# Patient Record
Sex: FEMALE | Race: WHITE | Employment: OTHER | URBAN - METROPOLITAN AREA
[De-identification: names, ages, dates, MRNs, and addresses within clinical notes are randomized per-mention and may not be internally consistent; named-entity substitution may affect disease eponyms.]

---

## 2017-10-30 ENCOUNTER — HOSPITAL ENCOUNTER (EMERGENCY)
Facility: HOSPITAL | Age: 67
Discharge: HOME | End: 2017-10-30
Attending: EMERGENCY MEDICINE
Payer: MEDICARE

## 2017-10-30 ENCOUNTER — APPOINTMENT (OUTPATIENT)
Dept: GENERAL RADIOLOGY | Facility: HOSPITAL | Age: 67
End: 2017-10-30
Attending: EMERGENCY MEDICINE
Payer: MEDICARE

## 2017-10-30 VITALS
SYSTOLIC BLOOD PRESSURE: 134 MMHG | HEART RATE: 67 BPM | TEMPERATURE: 98 F | OXYGEN SATURATION: 100 % | DIASTOLIC BLOOD PRESSURE: 75 MMHG | HEIGHT: 68 IN | RESPIRATION RATE: 16 BRPM | BODY MASS INDEX: 26.52 KG/M2 | WEIGHT: 175 LBS

## 2017-10-30 DIAGNOSIS — I47.1 SVT (SUPRAVENTRICULAR TACHYCARDIA) (HCC): Primary | ICD-10-CM

## 2017-10-30 PROCEDURE — 71010 XR CHEST AP PORTABLE  (CPT=71010): CPT | Performed by: EMERGENCY MEDICINE

## 2017-10-30 PROCEDURE — 93010 ELECTROCARDIOGRAM REPORT: CPT

## 2017-10-30 PROCEDURE — 96374 THER/PROPH/DIAG INJ IV PUSH: CPT

## 2017-10-30 PROCEDURE — 80053 COMPREHEN METABOLIC PANEL: CPT | Performed by: EMERGENCY MEDICINE

## 2017-10-30 PROCEDURE — 93005 ELECTROCARDIOGRAM TRACING: CPT

## 2017-10-30 PROCEDURE — 99285 EMERGENCY DEPT VISIT HI MDM: CPT

## 2017-10-30 PROCEDURE — 84443 ASSAY THYROID STIM HORMONE: CPT | Performed by: EMERGENCY MEDICINE

## 2017-10-30 RX ORDER — ADENOSINE 3 MG/ML
6 INJECTION, SOLUTION INTRAVENOUS ONCE
Status: COMPLETED | OUTPATIENT
Start: 2017-10-30 | End: 2017-10-30

## 2017-10-30 RX ORDER — ADENOSINE 3 MG/ML
INJECTION, SOLUTION INTRAVENOUS
Status: DISCONTINUED
Start: 2017-10-30 | End: 2017-10-30

## 2017-10-30 RX ORDER — METOPROLOL SUCCINATE 25 MG/1
25 TABLET, EXTENDED RELEASE ORAL DAILY
Qty: 30 TABLET | Refills: 0 | Status: SHIPPED | OUTPATIENT
Start: 2017-10-30 | End: 2017-11-03

## 2017-10-30 NOTE — ED PROVIDER NOTES
Patient Seen in: [facility] Emergency Department    History   Patient presents with:  Arrythmia/Palpitations (cardiovascular)    Stated Complaint: Palpitations    HPI    42-year-old female presents to the ER with rapid heart rate.   She was brought t 8\")   Wt 79.4 kg   SpO2 100%   BMI 26.61 kg/m²         Physical Exam  General: This a pleasant nontoxic appearing patient in no apparent distress alert and oriented ×3  HEENT: Pupils are equal reactive to light. Extra ocular motions are intact.   No scler 10/30/2017 at 15:39     Approved by: America Nesbitt MD              ============================================================  ED Course  ------------------------------------------------------------  [facility]   The patient had a line established and was p

## 2017-10-30 NOTE — ED INITIAL ASSESSMENT (HPI)
Pt had recently been wearing a halter monitor for complaints of palpitations. Went for follow up visit at Dr. Scar Bermeo office and pt was in SVT, hr 180's.

## 2017-11-16 PROCEDURE — 86803 HEPATITIS C AB TEST: CPT | Performed by: INTERNAL MEDICINE

## 2017-11-16 PROCEDURE — 81003 URINALYSIS AUTO W/O SCOPE: CPT | Performed by: INTERNAL MEDICINE

## 2018-01-03 ENCOUNTER — CHARTING TRANS (OUTPATIENT)
Dept: OTHER | Age: 68
End: 2018-01-03

## 2018-05-23 PROBLEM — I47.10 SVT (SUPRAVENTRICULAR TACHYCARDIA) (HCC): Status: ACTIVE | Noted: 2018-05-23

## 2018-05-23 PROBLEM — I47.10 SVT (SUPRAVENTRICULAR TACHYCARDIA): Status: ACTIVE | Noted: 2018-05-23

## 2018-05-23 PROBLEM — I47.1 SVT (SUPRAVENTRICULAR TACHYCARDIA) (HCC): Status: ACTIVE | Noted: 2018-05-23

## 2018-07-03 PROBLEM — E55.9 VITAMIN D DEFICIENCY: Status: ACTIVE | Noted: 2018-07-03

## 2018-07-11 PROBLEM — Z12.11 SCREENING FOR MALIGNANT NEOPLASM OF COLON: Status: ACTIVE | Noted: 2018-07-11

## 2018-07-24 PROBLEM — M17.0 PRIMARY OSTEOARTHRITIS OF BOTH KNEES: Status: ACTIVE | Noted: 2018-07-24

## 2018-10-22 PROCEDURE — 88305 TISSUE EXAM BY PATHOLOGIST: CPT | Performed by: SPECIALIST

## 2018-11-02 VITALS
OXYGEN SATURATION: 97 % | SYSTOLIC BLOOD PRESSURE: 132 MMHG | TEMPERATURE: 98.3 F | RESPIRATION RATE: 16 BRPM | HEART RATE: 160 BPM | DIASTOLIC BLOOD PRESSURE: 92 MMHG | WEIGHT: 170 LBS

## 2019-01-22 PROBLEM — M19.012 GLENOHUMERAL ARTHRITIS, LEFT: Status: ACTIVE | Noted: 2019-01-22

## 2019-01-22 PROBLEM — M75.22 LEFT BICIPITAL TENOSYNOVITIS: Status: ACTIVE | Noted: 2019-01-22

## 2019-01-22 PROBLEM — M75.42 IMPINGEMENT SYNDROME OF SHOULDER, LEFT: Status: ACTIVE | Noted: 2019-01-22

## 2019-03-22 PROBLEM — N18.30 CKD (CHRONIC KIDNEY DISEASE) STAGE 3, GFR 30-59 ML/MIN (HCC): Status: ACTIVE | Noted: 2019-03-22

## 2019-06-19 PROBLEM — M19.012 GLENOHUMERAL ARTHRITIS, LEFT: Status: RESOLVED | Noted: 2019-01-22 | Resolved: 2019-06-19

## 2019-06-19 PROBLEM — M75.22 LEFT BICIPITAL TENOSYNOVITIS: Status: RESOLVED | Noted: 2019-01-22 | Resolved: 2019-06-19

## 2019-06-19 PROBLEM — M17.0 PRIMARY OSTEOARTHRITIS OF BOTH KNEES: Status: RESOLVED | Noted: 2018-07-24 | Resolved: 2019-06-19

## 2019-06-19 PROBLEM — Z12.11 SCREENING FOR MALIGNANT NEOPLASM OF COLON: Status: RESOLVED | Noted: 2018-07-11 | Resolved: 2019-06-19

## 2019-06-19 PROBLEM — M75.42 IMPINGEMENT SYNDROME OF SHOULDER, LEFT: Status: RESOLVED | Noted: 2019-01-22 | Resolved: 2019-06-19

## 2019-06-26 PROBLEM — M17.12 PRIMARY OSTEOARTHRITIS OF LEFT KNEE: Status: ACTIVE | Noted: 2019-06-26

## 2019-10-10 PROCEDURE — 87077 CULTURE AEROBIC IDENTIFY: CPT | Performed by: INTERNAL MEDICINE

## 2019-10-10 PROCEDURE — 87186 SC STD MICRODIL/AGAR DIL: CPT | Performed by: INTERNAL MEDICINE

## 2019-10-10 PROCEDURE — 87086 URINE CULTURE/COLONY COUNT: CPT | Performed by: INTERNAL MEDICINE

## 2021-08-13 PROBLEM — N18.31 STAGE 3A CHRONIC KIDNEY DISEASE (HCC): Status: ACTIVE | Noted: 2019-03-22

## 2021-08-13 PROBLEM — N18.31 HYPERTENSIVE KIDNEY DISEASE WITH STAGE 3A CHRONIC KIDNEY DISEASE (HCC): Status: ACTIVE | Noted: 2021-08-13

## 2021-08-13 PROBLEM — I12.9 HYPERTENSIVE KIDNEY DISEASE WITH STAGE 3A CHRONIC KIDNEY DISEASE (HCC): Status: ACTIVE | Noted: 2021-08-13

## 2023-03-27 ENCOUNTER — TELEPHONE (OUTPATIENT)
Dept: PHYSICAL THERAPY | Facility: HOSPITAL | Age: 73
End: 2023-03-27

## 2023-03-29 ENCOUNTER — APPOINTMENT (OUTPATIENT)
Dept: SPEECH THERAPY | Facility: HOSPITAL | Age: 73
End: 2023-03-29
Attending: STUDENT IN AN ORGANIZED HEALTH CARE EDUCATION/TRAINING PROGRAM
Payer: COMMERCIAL

## 2023-04-03 ENCOUNTER — APPOINTMENT (OUTPATIENT)
Dept: SPEECH THERAPY | Facility: HOSPITAL | Age: 73
End: 2023-04-03
Attending: STUDENT IN AN ORGANIZED HEALTH CARE EDUCATION/TRAINING PROGRAM
Payer: COMMERCIAL

## 2023-04-05 ENCOUNTER — APPOINTMENT (OUTPATIENT)
Dept: SPEECH THERAPY | Facility: HOSPITAL | Age: 73
End: 2023-04-05
Attending: STUDENT IN AN ORGANIZED HEALTH CARE EDUCATION/TRAINING PROGRAM
Payer: COMMERCIAL

## 2023-04-10 ENCOUNTER — APPOINTMENT (OUTPATIENT)
Dept: SPEECH THERAPY | Facility: HOSPITAL | Age: 73
End: 2023-04-10
Attending: STUDENT IN AN ORGANIZED HEALTH CARE EDUCATION/TRAINING PROGRAM
Payer: COMMERCIAL

## 2023-04-12 ENCOUNTER — APPOINTMENT (OUTPATIENT)
Dept: SPEECH THERAPY | Facility: HOSPITAL | Age: 73
End: 2023-04-12
Attending: STUDENT IN AN ORGANIZED HEALTH CARE EDUCATION/TRAINING PROGRAM
Payer: COMMERCIAL

## 2023-04-17 ENCOUNTER — APPOINTMENT (OUTPATIENT)
Dept: SPEECH THERAPY | Facility: HOSPITAL | Age: 73
End: 2023-04-17
Attending: STUDENT IN AN ORGANIZED HEALTH CARE EDUCATION/TRAINING PROGRAM
Payer: COMMERCIAL

## 2023-10-17 ENCOUNTER — LAB ENCOUNTER (OUTPATIENT)
Dept: LAB | Age: 73
End: 2023-10-17
Attending: INTERNAL MEDICINE
Payer: MEDICARE

## 2023-10-17 ENCOUNTER — LAB ENCOUNTER (OUTPATIENT)
Dept: LAB | Age: 73
End: 2023-10-17
Attending: STUDENT IN AN ORGANIZED HEALTH CARE EDUCATION/TRAINING PROGRAM
Payer: MEDICARE

## 2023-10-17 DIAGNOSIS — R63.4 WEIGHT LOSS: ICD-10-CM

## 2023-10-17 LAB
ALBUMIN SERPL-MCNC: 3.4 G/DL (ref 3.4–5)
ALBUMIN/GLOB SERPL: 1.1 {RATIO} (ref 1–2)
ALP LIVER SERPL-CCNC: 56 U/L
ALT SERPL-CCNC: 22 U/L
ANION GAP SERPL CALC-SCNC: 7 MMOL/L (ref 0–18)
AST SERPL-CCNC: 19 U/L (ref 15–37)
BASOPHILS # BLD AUTO: 0.06 X10(3) UL (ref 0–0.2)
BASOPHILS NFR BLD AUTO: 0.9 %
BILIRUB SERPL-MCNC: 0.6 MG/DL (ref 0.1–2)
BUN BLD-MCNC: 18 MG/DL (ref 7–18)
CALCIUM BLD-MCNC: 9.2 MG/DL (ref 8.5–10.1)
CHLORIDE SERPL-SCNC: 105 MMOL/L (ref 98–112)
CO2 SERPL-SCNC: 27 MMOL/L (ref 21–32)
CREAT BLD-MCNC: 0.88 MG/DL
EGFRCR SERPLBLD CKD-EPI 2021: 69 ML/MIN/1.73M2 (ref 60–?)
EOSINOPHIL # BLD AUTO: 0.04 X10(3) UL (ref 0–0.7)
EOSINOPHIL NFR BLD AUTO: 0.6 %
ERYTHROCYTE [DISTWIDTH] IN BLOOD BY AUTOMATED COUNT: 13.2 %
FASTING STATUS PATIENT QL REPORTED: YES
GLOBULIN PLAS-MCNC: 3.1 G/DL (ref 2.8–4.4)
GLUCOSE BLD-MCNC: 83 MG/DL (ref 70–99)
HCT VFR BLD AUTO: 34.5 %
HGB BLD-MCNC: 11.3 G/DL
IGA SERPL-MCNC: 266 MG/DL (ref 70–312)
IMM GRANULOCYTES # BLD AUTO: 0.02 X10(3) UL (ref 0–1)
IMM GRANULOCYTES NFR BLD: 0.3 %
LYMPHOCYTES # BLD AUTO: 1.4 X10(3) UL (ref 1–4)
LYMPHOCYTES NFR BLD AUTO: 21.3 %
MCH RBC QN AUTO: 30.1 PG (ref 26–34)
MCHC RBC AUTO-ENTMCNC: 32.8 G/DL (ref 31–37)
MCV RBC AUTO: 91.8 FL
MONOCYTES # BLD AUTO: 0.4 X10(3) UL (ref 0.1–1)
MONOCYTES NFR BLD AUTO: 6.1 %
NEUTROPHILS # BLD AUTO: 4.64 X10 (3) UL (ref 1.5–7.7)
NEUTROPHILS # BLD AUTO: 4.64 X10(3) UL (ref 1.5–7.7)
NEUTROPHILS NFR BLD AUTO: 70.8 %
OSMOLALITY SERPL CALC.SUM OF ELEC: 289 MOSM/KG (ref 275–295)
PLATELET # BLD AUTO: 273 10(3)UL (ref 150–450)
POTASSIUM SERPL-SCNC: 4.5 MMOL/L (ref 3.5–5.1)
PROT SERPL-MCNC: 6.5 G/DL (ref 6.4–8.2)
RBC # BLD AUTO: 3.76 X10(6)UL
SODIUM SERPL-SCNC: 139 MMOL/L (ref 136–145)
WBC # BLD AUTO: 6.6 X10(3) UL (ref 4–11)

## 2023-10-17 PROCEDURE — 86231 EMA EACH IG CLASS: CPT

## 2023-10-17 PROCEDURE — 36415 COLL VENOUS BLD VENIPUNCTURE: CPT

## 2023-10-17 PROCEDURE — 80053 COMPREHEN METABOLIC PANEL: CPT

## 2023-10-17 PROCEDURE — 82784 ASSAY IGA/IGD/IGG/IGM EACH: CPT

## 2023-10-17 PROCEDURE — 85025 COMPLETE CBC W/AUTO DIFF WBC: CPT

## 2023-10-17 PROCEDURE — 86364 TISS TRNSGLTMNASE EA IG CLAS: CPT

## 2023-10-18 LAB — ENDOMYSIAL IGA AB: NEGATIVE

## 2023-10-19 ENCOUNTER — ANESTHESIA EVENT (OUTPATIENT)
Dept: ENDOSCOPY | Facility: HOSPITAL | Age: 73
End: 2023-10-19
Payer: MEDICARE

## 2023-10-19 ENCOUNTER — ANESTHESIA (OUTPATIENT)
Dept: ENDOSCOPY | Facility: HOSPITAL | Age: 73
End: 2023-10-19
Payer: MEDICARE

## 2023-10-19 ENCOUNTER — HOSPITAL ENCOUNTER (OUTPATIENT)
Facility: HOSPITAL | Age: 73
Setting detail: HOSPITAL OUTPATIENT SURGERY
Discharge: HOME | End: 2023-10-19
Attending: STUDENT IN AN ORGANIZED HEALTH CARE EDUCATION/TRAINING PROGRAM | Admitting: STUDENT IN AN ORGANIZED HEALTH CARE EDUCATION/TRAINING PROGRAM
Payer: MEDICARE

## 2023-10-19 VITALS
OXYGEN SATURATION: 98 % | DIASTOLIC BLOOD PRESSURE: 64 MMHG | SYSTOLIC BLOOD PRESSURE: 136 MMHG | WEIGHT: 111 LBS | RESPIRATION RATE: 16 BRPM | TEMPERATURE: 98 F | HEART RATE: 60 BPM | HEIGHT: 67.5 IN | BODY MASS INDEX: 17.22 KG/M2

## 2023-10-19 DIAGNOSIS — R63.4 WEIGHT LOSS: ICD-10-CM

## 2023-10-19 PROCEDURE — 88305 TISSUE EXAM BY PATHOLOGIST: CPT | Performed by: STUDENT IN AN ORGANIZED HEALTH CARE EDUCATION/TRAINING PROGRAM

## 2023-10-19 PROCEDURE — 0DB78ZX EXCISION OF STOMACH, PYLORUS, VIA NATURAL OR ARTIFICIAL OPENING ENDOSCOPIC, DIAGNOSTIC: ICD-10-PCS | Performed by: STUDENT IN AN ORGANIZED HEALTH CARE EDUCATION/TRAINING PROGRAM

## 2023-10-19 PROCEDURE — 0DB98ZX EXCISION OF DUODENUM, VIA NATURAL OR ARTIFICIAL OPENING ENDOSCOPIC, DIAGNOSTIC: ICD-10-PCS | Performed by: STUDENT IN AN ORGANIZED HEALTH CARE EDUCATION/TRAINING PROGRAM

## 2023-10-19 RX ORDER — ONDANSETRON 2 MG/ML
4 INJECTION INTRAMUSCULAR; INTRAVENOUS EVERY 6 HOURS PRN
Status: DISCONTINUED | OUTPATIENT
Start: 2023-10-19 | End: 2023-10-19

## 2023-10-19 RX ORDER — SODIUM CHLORIDE, SODIUM LACTATE, POTASSIUM CHLORIDE, CALCIUM CHLORIDE 600; 310; 30; 20 MG/100ML; MG/100ML; MG/100ML; MG/100ML
INJECTION, SOLUTION INTRAVENOUS CONTINUOUS
Status: DISCONTINUED | OUTPATIENT
Start: 2023-10-19 | End: 2023-10-19

## 2023-10-19 RX ORDER — HYDROCODONE BITARTRATE AND ACETAMINOPHEN 5; 325 MG/1; MG/1
2 TABLET ORAL ONCE AS NEEDED
Status: DISCONTINUED | OUTPATIENT
Start: 2023-10-19 | End: 2023-10-19

## 2023-10-19 RX ORDER — ACETAMINOPHEN 500 MG
1000 TABLET ORAL ONCE AS NEEDED
Status: DISCONTINUED | OUTPATIENT
Start: 2023-10-19 | End: 2023-10-19

## 2023-10-19 RX ORDER — HYDROMORPHONE HYDROCHLORIDE 1 MG/ML
0.6 INJECTION, SOLUTION INTRAMUSCULAR; INTRAVENOUS; SUBCUTANEOUS EVERY 5 MIN PRN
Status: DISCONTINUED | OUTPATIENT
Start: 2023-10-19 | End: 2023-10-19

## 2023-10-19 RX ORDER — NALOXONE HYDROCHLORIDE 0.4 MG/ML
0.08 INJECTION, SOLUTION INTRAMUSCULAR; INTRAVENOUS; SUBCUTANEOUS AS NEEDED
Status: DISCONTINUED | OUTPATIENT
Start: 2023-10-19 | End: 2023-10-19

## 2023-10-19 RX ORDER — HYDROMORPHONE HYDROCHLORIDE 1 MG/ML
0.4 INJECTION, SOLUTION INTRAMUSCULAR; INTRAVENOUS; SUBCUTANEOUS EVERY 5 MIN PRN
Status: DISCONTINUED | OUTPATIENT
Start: 2023-10-19 | End: 2023-10-19

## 2023-10-19 RX ORDER — HYDROMORPHONE HYDROCHLORIDE 1 MG/ML
0.2 INJECTION, SOLUTION INTRAMUSCULAR; INTRAVENOUS; SUBCUTANEOUS EVERY 5 MIN PRN
Status: DISCONTINUED | OUTPATIENT
Start: 2023-10-19 | End: 2023-10-19

## 2023-10-19 RX ORDER — HYDROCODONE BITARTRATE AND ACETAMINOPHEN 5; 325 MG/1; MG/1
1 TABLET ORAL ONCE AS NEEDED
Status: DISCONTINUED | OUTPATIENT
Start: 2023-10-19 | End: 2023-10-19

## 2023-10-19 RX ORDER — METOCLOPRAMIDE HYDROCHLORIDE 5 MG/ML
10 INJECTION INTRAMUSCULAR; INTRAVENOUS EVERY 8 HOURS PRN
Status: DISCONTINUED | OUTPATIENT
Start: 2023-10-19 | End: 2023-10-19

## 2023-10-19 RX ADMIN — SODIUM CHLORIDE, SODIUM LACTATE, POTASSIUM CHLORIDE, CALCIUM CHLORIDE: 600; 310; 30; 20 INJECTION, SOLUTION INTRAVENOUS at 10:54:00

## 2023-10-19 NOTE — DISCHARGE INSTRUCTIONS
Per Dr Lavonne Bran  There are no inflammatory changes or worrisome findings in side the stomach, esophagus, or duodenum. There is a small hiatal hernia noted, but no acid reflux inflammation was seen (the primary complication of hiatal hernias). There appears to be something pushing inwards on the lower esophagus. To be better evaluate the structures and organs outside the esophagus, I would recommend a CT chest and abdomen. Follow-up in office as needed.

## 2023-10-19 NOTE — OPERATIVE REPORT
EGD operative report  Patient Name: Segundo Phillip  Procedure: Esophagogastroduodenoscopy with biopsy   Indication: weight loss  Attending: Rogers Walker M.D. Consent:  The risks, benefits, and alternatives were discussed with the patient / POA. Risks included, but were not limited to, bleeding, perforation, medication effects, cardiac arrhythmias, and aspiration. After all questions were answered to their satisfaction, a signed, informed, and witnessed consent was obtained. Sedation: Monitored Anesthesia Care  Monitoring:  Pulsoximetry, pulse, respirations, and blood pressure were monitored throughout the entire procedure  Procedure: After achieving adequate sedation and placing the patient in the left lateral decubitus position, the lubricated upper endoscope was introduced into the mouth and advanced to the descending duodenum. The endoscope was then withdrawn into the gastric antrum and placed in a retroflexed position. The endoscope was then righted, and air was suctioned from the stomach. The endoscope was then withdrawn from the patient, with careful visual inspection of the mucosa revealing no additional pathologic findings. The patient tolerated the procedure without apparent procedural complications. The patient left the procedure room in stable condition for recovery. Findings:   Esophagus: The mucosa was normal.  There is some subtle compression of the distal esophageal lumen, suspicious for non-obstructive extrinsic compression of the esophagus by a mediastinal stricture. GE junction: The mucosa is normal.  There are no inflammatory changes noted. Stomach: The gastric body, antrum, fundus, cardia, and angularis were normal.  Biopsies were obtained from the antrum, body, and fundus, to evaluate for H.pylori.    Duodenum: The duodenal bulb, post-bulbar duodenum, and descending duodenum were normal.  Biopsies were obtained from the distal and proximal duodenum to evaluate for Celiac sara.  Impression: Findings as above. Recommendations:   No mucosal abnormalities to explain symptoms.   Await pathology  CT chest / abd with oral and IV contrast to rule out extrinsic compression from mediastinal structure    Karel Duran MD

## 2023-10-20 LAB — TTG IGA SER-ACNC: 0.5 U/ML (ref ?–7)

## 2023-11-16 ENCOUNTER — HOSPITAL ENCOUNTER (OUTPATIENT)
Dept: CT IMAGING | Facility: HOSPITAL | Age: 73
Discharge: HOME | End: 2023-11-16
Attending: STUDENT IN AN ORGANIZED HEALTH CARE EDUCATION/TRAINING PROGRAM
Payer: MEDICARE

## 2023-11-16 DIAGNOSIS — K22.9 ESOPHAGEAL LESION: ICD-10-CM

## 2023-11-16 PROCEDURE — 71260 CT THORAX DX C+: CPT | Performed by: STUDENT IN AN ORGANIZED HEALTH CARE EDUCATION/TRAINING PROGRAM

## 2023-11-16 PROCEDURE — 74160 CT ABDOMEN W/CONTRAST: CPT | Performed by: STUDENT IN AN ORGANIZED HEALTH CARE EDUCATION/TRAINING PROGRAM

## 2024-01-31 ENCOUNTER — LAB ENCOUNTER (OUTPATIENT)
Dept: LAB | Age: 74
End: 2024-01-31
Attending: STUDENT IN AN ORGANIZED HEALTH CARE EDUCATION/TRAINING PROGRAM
Payer: MEDICARE

## 2024-01-31 DIAGNOSIS — D64.9 ANEMIA, UNSPECIFIED TYPE: ICD-10-CM

## 2024-01-31 LAB
BASOPHILS # BLD AUTO: 0.06 X10(3) UL (ref 0–0.2)
BASOPHILS NFR BLD AUTO: 0.7 %
DEPRECATED HBV CORE AB SER IA-ACNC: 252.4 NG/ML
EOSINOPHIL # BLD AUTO: 0.07 X10(3) UL (ref 0–0.7)
EOSINOPHIL NFR BLD AUTO: 0.9 %
ERYTHROCYTE [DISTWIDTH] IN BLOOD BY AUTOMATED COUNT: 12.8 %
FOLATE SERPL-MCNC: 72.6 NG/ML (ref 8.7–?)
HCT VFR BLD AUTO: 39.7 %
HGB BLD-MCNC: 13.1 G/DL
IMM GRANULOCYTES # BLD AUTO: 0.02 X10(3) UL (ref 0–1)
IMM GRANULOCYTES NFR BLD: 0.2 %
IRON SATN MFR SERPL: 26 %
IRON SERPL-MCNC: 101 UG/DL
LYMPHOCYTES # BLD AUTO: 1.8 X10(3) UL (ref 1–4)
LYMPHOCYTES NFR BLD AUTO: 22.1 %
MCH RBC QN AUTO: 30.5 PG (ref 26–34)
MCHC RBC AUTO-ENTMCNC: 33 G/DL (ref 31–37)
MCV RBC AUTO: 92.5 FL
MONOCYTES # BLD AUTO: 0.57 X10(3) UL (ref 0.1–1)
MONOCYTES NFR BLD AUTO: 7 %
NEUTROPHILS # BLD AUTO: 5.62 X10 (3) UL (ref 1.5–7.7)
NEUTROPHILS # BLD AUTO: 5.62 X10(3) UL (ref 1.5–7.7)
NEUTROPHILS NFR BLD AUTO: 69.1 %
PLATELET # BLD AUTO: 326 10(3)UL (ref 150–450)
RBC # BLD AUTO: 4.29 X10(6)UL
TIBC SERPL-MCNC: 390 UG/DL (ref 240–450)
TRANSFERRIN SERPL-MCNC: 262 MG/DL (ref 200–360)
VIT B12 SERPL-MCNC: 1647 PG/ML (ref 193–986)
WBC # BLD AUTO: 8.1 X10(3) UL (ref 4–11)

## 2024-01-31 PROCEDURE — 83550 IRON BINDING TEST: CPT

## 2024-01-31 PROCEDURE — 82746 ASSAY OF FOLIC ACID SERUM: CPT

## 2024-01-31 PROCEDURE — 82728 ASSAY OF FERRITIN: CPT

## 2024-01-31 PROCEDURE — 36415 COLL VENOUS BLD VENIPUNCTURE: CPT

## 2024-01-31 PROCEDURE — 82607 VITAMIN B-12: CPT

## 2024-01-31 PROCEDURE — 83540 ASSAY OF IRON: CPT

## 2024-01-31 PROCEDURE — 85025 COMPLETE CBC W/AUTO DIFF WBC: CPT

## 2024-01-31 NOTE — PROGRESS NOTES
Kierra,    Labs look great.  Here is no evidence of any anemia or iron deficiency at this time.  Your CT scan did not reveal any chest lesion or concerning findings around the esophagus.  Suspect the findings on the endoscopy are simply from a nearby blood vessel adjacent to the esophagus.  There are no tumors or masses seen on the CT to worry about.  No further testing, but if you develop any difficulty swallowing or sensation of food being stuck while swallowing, notify the office and we can discuss repeating any testing.  Please call with any questions,    Lio Cruz MD

## 2024-05-16 ENCOUNTER — APPOINTMENT (OUTPATIENT)
Dept: MRI IMAGING | Facility: HOSPITAL | Age: 74
End: 2024-05-16
Attending: STUDENT IN AN ORGANIZED HEALTH CARE EDUCATION/TRAINING PROGRAM

## 2024-05-16 ENCOUNTER — HOSPITAL ENCOUNTER (EMERGENCY)
Facility: HOSPITAL | Age: 74
Discharge: HOME | End: 2024-05-16
Attending: STUDENT IN AN ORGANIZED HEALTH CARE EDUCATION/TRAINING PROGRAM

## 2024-05-16 ENCOUNTER — APPOINTMENT (OUTPATIENT)
Dept: CT IMAGING | Facility: HOSPITAL | Age: 74
End: 2024-05-16
Attending: STUDENT IN AN ORGANIZED HEALTH CARE EDUCATION/TRAINING PROGRAM

## 2024-05-16 VITALS
OXYGEN SATURATION: 100 % | TEMPERATURE: 98 F | WEIGHT: 115 LBS | RESPIRATION RATE: 15 BRPM | HEART RATE: 50 BPM | SYSTOLIC BLOOD PRESSURE: 174 MMHG | HEIGHT: 67 IN | DIASTOLIC BLOOD PRESSURE: 87 MMHG | BODY MASS INDEX: 18.05 KG/M2

## 2024-05-16 DIAGNOSIS — F03.90 DEMENTIA, UNSPECIFIED DEMENTIA SEVERITY, UNSPECIFIED DEMENTIA TYPE, UNSPECIFIED WHETHER BEHAVIORAL, PSYCHOTIC, OR MOOD DISTURBANCE OR ANXIETY (HCC): ICD-10-CM

## 2024-05-16 DIAGNOSIS — R41.0 EPISODE OF CONFUSION: Primary | ICD-10-CM

## 2024-05-16 LAB
ALBUMIN SERPL-MCNC: 3.6 G/DL (ref 3.4–5)
ALBUMIN/GLOB SERPL: 1 {RATIO} (ref 1–2)
ALP LIVER SERPL-CCNC: 88 U/L
ALT SERPL-CCNC: 20 U/L
ANION GAP SERPL CALC-SCNC: 7 MMOL/L (ref 0–18)
AST SERPL-CCNC: 22 U/L (ref 15–37)
ATRIAL RATE: 48 BPM
BASOPHILS # BLD AUTO: 0.06 X10(3) UL (ref 0–0.2)
BASOPHILS NFR BLD AUTO: 0.7 %
BILIRUB SERPL-MCNC: 0.2 MG/DL (ref 0.1–2)
BILIRUB UR QL STRIP.AUTO: NEGATIVE
BUN BLD-MCNC: 18 MG/DL (ref 9–23)
CALCIUM BLD-MCNC: 9.3 MG/DL (ref 8.5–10.1)
CHLORIDE SERPL-SCNC: 108 MMOL/L (ref 98–112)
CLARITY UR REFRACT.AUTO: CLEAR
CO2 SERPL-SCNC: 24 MMOL/L (ref 21–32)
COLOR UR AUTO: YELLOW
CREAT BLD-MCNC: 0.91 MG/DL
EGFRCR SERPLBLD CKD-EPI 2021: 66 ML/MIN/1.73M2 (ref 60–?)
EOSINOPHIL # BLD AUTO: 0.13 X10(3) UL (ref 0–0.7)
EOSINOPHIL NFR BLD AUTO: 1.6 %
ERYTHROCYTE [DISTWIDTH] IN BLOOD BY AUTOMATED COUNT: 12.8 %
GLOBULIN PLAS-MCNC: 3.5 G/DL (ref 2.8–4.4)
GLUCOSE BLD-MCNC: 97 MG/DL (ref 70–99)
GLUCOSE UR STRIP.AUTO-MCNC: NORMAL MG/DL
HCT VFR BLD AUTO: 39.3 %
HGB BLD-MCNC: 12.6 G/DL
IMM GRANULOCYTES # BLD AUTO: 0.03 X10(3) UL (ref 0–1)
IMM GRANULOCYTES NFR BLD: 0.4 %
KETONES UR STRIP.AUTO-MCNC: NEGATIVE MG/DL
LEUKOCYTE ESTERASE UR QL STRIP.AUTO: NEGATIVE
LIPASE SERPL-CCNC: 38 U/L (ref 13–75)
LYMPHOCYTES # BLD AUTO: 2.09 X10(3) UL (ref 1–4)
LYMPHOCYTES NFR BLD AUTO: 24.9 %
MCH RBC QN AUTO: 29.3 PG (ref 26–34)
MCHC RBC AUTO-ENTMCNC: 32.1 G/DL (ref 31–37)
MCV RBC AUTO: 91.4 FL
MONOCYTES # BLD AUTO: 0.7 X10(3) UL (ref 0.1–1)
MONOCYTES NFR BLD AUTO: 8.4 %
NEUTROPHILS # BLD AUTO: 5.37 X10 (3) UL (ref 1.5–7.7)
NEUTROPHILS # BLD AUTO: 5.37 X10(3) UL (ref 1.5–7.7)
NEUTROPHILS NFR BLD AUTO: 64 %
NITRITE UR QL STRIP.AUTO: NEGATIVE
OSMOLALITY SERPL CALC.SUM OF ELEC: 290 MOSM/KG (ref 275–295)
P-R INTERVAL: 184 MS
PH UR STRIP.AUTO: 5 [PH] (ref 5–8)
PLATELET # BLD AUTO: 284 10(3)UL (ref 150–450)
POTASSIUM SERPL-SCNC: 4.2 MMOL/L (ref 3.5–5.1)
PROT SERPL-MCNC: 7.1 G/DL (ref 6.4–8.2)
Q-T INTERVAL: 470 MS
QRS DURATION: 104 MS
QTC CALCULATION (BEZET): 419 MS
R AXIS: 129 DEGREES
RBC # BLD AUTO: 4.3 X10(6)UL
RBC UR QL AUTO: NEGATIVE
SODIUM SERPL-SCNC: 139 MMOL/L (ref 136–145)
SP GR UR STRIP.AUTO: 1.03 (ref 1–1.03)
T AXIS: 145 DEGREES
UROBILINOGEN UR STRIP.AUTO-MCNC: NORMAL MG/DL
VENTRICULAR RATE: 48 BPM
WBC # BLD AUTO: 8.4 X10(3) UL (ref 4–11)

## 2024-05-16 PROCEDURE — 80053 COMPREHEN METABOLIC PANEL: CPT | Performed by: STUDENT IN AN ORGANIZED HEALTH CARE EDUCATION/TRAINING PROGRAM

## 2024-05-16 PROCEDURE — 93005 ELECTROCARDIOGRAM TRACING: CPT

## 2024-05-16 PROCEDURE — 99285 EMERGENCY DEPT VISIT HI MDM: CPT

## 2024-05-16 PROCEDURE — 70551 MRI BRAIN STEM W/O DYE: CPT | Performed by: STUDENT IN AN ORGANIZED HEALTH CARE EDUCATION/TRAINING PROGRAM

## 2024-05-16 PROCEDURE — 85025 COMPLETE CBC W/AUTO DIFF WBC: CPT | Performed by: STUDENT IN AN ORGANIZED HEALTH CARE EDUCATION/TRAINING PROGRAM

## 2024-05-16 PROCEDURE — 83690 ASSAY OF LIPASE: CPT | Performed by: STUDENT IN AN ORGANIZED HEALTH CARE EDUCATION/TRAINING PROGRAM

## 2024-05-16 PROCEDURE — 93010 ELECTROCARDIOGRAM REPORT: CPT

## 2024-05-16 PROCEDURE — 70450 CT HEAD/BRAIN W/O DYE: CPT | Performed by: STUDENT IN AN ORGANIZED HEALTH CARE EDUCATION/TRAINING PROGRAM

## 2024-05-16 PROCEDURE — 36415 COLL VENOUS BLD VENIPUNCTURE: CPT

## 2024-05-16 PROCEDURE — 81003 URINALYSIS AUTO W/O SCOPE: CPT | Performed by: STUDENT IN AN ORGANIZED HEALTH CARE EDUCATION/TRAINING PROGRAM

## 2024-05-16 NOTE — ED INITIAL ASSESSMENT (HPI)
Pt to ER via [facility], was at a store trying to access her car with a credit card Family stated to EMS dementia at baseline, Aox3, able to care for self. Pt Aoxself and month, cannot state location or situation. Pt denies any complaints.

## 2024-05-16 NOTE — ED PROVIDER NOTES
Patient Seen in: [facility] Emergency Department      History     Chief Complaint   Patient presents with    Altered Mental Status     Stated Complaint: altered mental, used credit card to access vehicle, hx of dementia    Subjective:   HPI    74-year-old female history of dementia brought in by EMS after she was seen at [facility] trying to use her credit card to open her car door.  The patient is alert and oriented to person time place.  She has no complaints of pain headache or neurological deficit.    Objective:   Past Medical History:    Arrhythmia    Arthritis    Dementia (HCC)    Glenohumeral arthritis, left    Heart palpitations    HTN (hypertension)    Impingement syndrome of shoulder, left    Left bicipital tenosynovitis    Osteoporosis    Pituitary adenoma (HCC)    surgery 1982.     Primary osteoarthritis of both knees    PVD (posterior vitreous detachment), both eyes    Screening for malignant neoplasm of colon    Weight loss              Past Surgical History:   Procedure Laterality Date    Colonoscopy  2008    Colonoscopy N/A 10/22/2018    Procedure: COLONOSCOPY, POSSIBLE BIOPSY, POSSIBLE POLYPECTOMY 75868;  Surgeon: Valentina Roman MD;  Location: [facility], [facility]    Cyst aspiration right  1975    pt not 100% sure this even happened    Hip replacement surgery Right 2003                Social History     Socioeconomic History    Marital status:     Number of children: 0   Occupational History    Occupation: retired [employer], .    Tobacco Use    Smoking status: Former    Smokeless tobacco: Never   Vaping Use    Vaping status: Never Used   Substance and Sexual Activity    Alcohol use: Not Currently     Comment: 1 glass 2-3 x per week.     Drug use: No     Social Determinants of Health     Financial Resource Strain: Low Risk  (1/31/2022)    [facility]    Overall Financial Resource Strain (CARDIA)     Difficulty of Paying Living  Expenses: Not hard at all   Food Insecurity: No Food Insecurity (1/31/2022)    [facility]    Hunger Vital Sign     Worried About Running Out of Food in the Last Year: Never true     Ran Out of Food in the Last Year: Never true   Transportation Needs: No Transportation Needs (1/31/2022)    [facility]    PRAPARE - Transportation     Lack of Transportation (Medical): No     Lack of Transportation (Non-Medical): No   Physical Activity: Sufficiently Active (1/31/2022)    Exercise Vital Sign     Days of Exercise per Week: 6 days     Minutes of Exercise per Session: 30 min   Stress: No Stress Concern Present (1/31/2022)    [facility]    [place] [employer] - Occupational Stress Questionnaire     Feeling of Stress : Only a little   Social Connections: Moderately Isolated (1/31/2022)    Received from [facility]    Social Connection and Isolation Panel [NHANES]     Frequency of Communication with Friends and Family: Three times a week     Frequency of Social Gatherings with Friends and Family: Once a week     Attends Latter day Services: Never     Active Member of Clubs or Organizations: No     Attends Club or Organization Meetings: Never     Marital Status:    Housing Stability: Low Risk  (1/31/2022)    Received from [facility]    Housing Stability Vital Sign     Unable to Pay for Housing in the Last Year: No     Number of Places Lived in the Last Year: 1     In the last 12 months, was there a time when you did not have a steady place to sleep or slept in a shelter (including now)?: No              Review of Systems    Positive for stated complaint: altered mental, used credit card to access vehicle, hx of dementia  Other systems are as noted in HPI.  Constitutional and vital signs reviewed.      All  other systems reviewed and negative except as noted above.    Physical Exam     ED Triage Vitals   BP 05/16/24 1415 (!) 174/87   Pulse 05/16/24 1321 52   Resp 05/16/24 1321 18   Temp 05/16/24 1321 98.2 °F (36.8 °C)   Temp src 05/16/24 1321 Temporal   SpO2 05/16/24 1321 99 %   O2 Device 05/16/24 1321 None (Room air)       Current Vitals:   No data recorded          Physical Exam  Vitals and nursing note reviewed.   Constitutional:       General: She is not in acute distress.     Appearance: Normal appearance.   HENT:      Head: Normocephalic.      Nose: Nose normal.      Mouth/Throat:      Mouth: Mucous membranes are moist.   Eyes:      Extraocular Movements: Extraocular movements intact.      Pupils: Pupils are equal, round, and reactive to light.   Cardiovascular:      Rate and Rhythm: Normal rate and regular rhythm.      Pulses: Normal pulses.   Pulmonary:      Effort: Pulmonary effort is normal.      Breath sounds: Normal breath sounds.   Abdominal:      General: Abdomen is flat. Bowel sounds are normal. There is no distension.      Palpations: Abdomen is soft.      Tenderness: There is no abdominal tenderness. There is no right CVA tenderness, left CVA tenderness, guarding or rebound.      Hernia: No hernia is present.   Musculoskeletal:         General: No swelling or tenderness. Normal range of motion.      Cervical back: Normal range of motion.   Skin:     General: Skin is warm and dry.   Neurological:      Mental Status: She is alert and oriented to person, place, and time. Mental status is at baseline.   Psychiatric:         Mood and Affect: Mood normal.               ED Course     Labs Reviewed   URINALYSIS WITH CULTURE REFLEX - Abnormal; Notable for the following components:       Result Value    Protein Urine Trace (*)     All other components within normal limits   COMP METABOLIC PANEL (14) - Normal   LIPASE - Normal   CBC WITH DIFFERENTIAL WITH PLATELET    Narrative:     The following orders were  created for panel order CBC With Differential With Platelet.  Procedure                               Abnormality         Status                     ---------                               -----------         ------                     CBC W/ DIFFERENTIAL[[id number]]                              Final result                 Please view results for these tests on the individual orders.   RAINBOW DRAW LAVENDER   RAINBOW DRAW LIGHT GREEN   RAINBOW DRAW BLUE   CBC W/ DIFFERENTIAL     EKG    Rate, intervals and axes as noted on EKG Report.  Rate: 48  Rhythm: Sinus Rhythm  Reading: HR decreased but no other changes when compared to prior EKG.          MRI BRAIN (CPT=70551)    Result Date: 5/16/2024  CONCLUSION:   1. No acute intracranial abnormality identified.  2. Minimal chronic microvascular ischemic changes in the cerebral white matter.   LOCATION:  [id number]   Dictated by (CST): Ricardo Owen MD on 5/16/2024 at 4:20 PM     Finalized by (CST): Ricardo Owen MD on 5/16/2024 at 4:22 PM       CT BRAIN OR HEAD ([id number])    Result Date: 5/16/2024  CONCLUSION:  No acute intracranial process.    LOCATION:  [facility]   Dictated by (CST): Jaret Reynaga MD on 5/16/2024 at 2:24 PM     Finalized by (CST): Jaret Reynaga MD on 5/16/2024 at 2:25 PM          MDM      The differential includes the following  ICH, acute CVA, Intracranial mass, progressing dementia     Pertinent comorbidities include  As listed above     Pertinent social history includes  As listed above     Labs  Neg trop, cmp, cbc all unremarkable     Imaging studies  I reviewed the images and my independent interpreation after review is no ICH on cthead. Additionaly, I reviewd the radiology report that states the following MRI w/o findings acute findings    External data reviewed  Prior labs   Discussion of management with external providers    ER course  Pt neurologically intact but there is still concern her episode could be reflective of acue CVA. CT head neg and labs  unremarkable. Therefore MRI obtained which was ultimately negative. Pt sister here and verifies that the patient is at her baseline.  consulted to see what services could be provided to the patient at home. Pt discharged.         Medical Decision Making      Disposition and Plan     Clinical Impression:  1. Episode of confusion    2. Dementia, unspecified dementia severity, unspecified dementia type, unspecified whether behavioral, psychotic, or mood disturbance or anxiety (HCC)         Disposition:  Discharge  5/16/2024  4:38 pm    Follow-up:  Steve Agrawal MD  [address] [address]  [phone]    Follow up            Medications Prescribed:  Discharge Medication List as of 5/16/2024  4:42 PM

## 2024-05-17 NOTE — CM/SW NOTE
LATE ENTRY-  EDCM SPOKE WITH PT'S SISTER ABOUT CAREGIVERS AND OR POSSIBLE LTC FACILITIES.  PT SISTER STATED THAT PATIENT HAD BEEN SEEN BY A NEUROLOGIST AND PSYCHIATRIST.     SISTER KRISTINE STATES THAT SHE HAS POA AND IS IN NEED OF CARE GIVERS AT THIS TIME.   EDCM GAVE RESOURCES FOR CAREGIVERS AND A PLACE FOR MOM.  SISTER KRISTINE WAS APPRECIATIVE.    Kianna Hunt MSN, BESSIE, RN  Emergency Department   [place] [phone]

## 2024-10-23 ENCOUNTER — HOSPITAL ENCOUNTER (EMERGENCY)
Facility: HOSPITAL | Age: 74
Discharge: HOME | End: 2024-10-23
Attending: EMERGENCY MEDICINE
Payer: MEDICARE

## 2024-10-23 ENCOUNTER — APPOINTMENT (OUTPATIENT)
Dept: CT IMAGING | Facility: HOSPITAL | Age: 74
End: 2024-10-23
Attending: EMERGENCY MEDICINE
Payer: MEDICARE

## 2024-10-23 VITALS
DIASTOLIC BLOOD PRESSURE: 68 MMHG | HEART RATE: 52 BPM | SYSTOLIC BLOOD PRESSURE: 164 MMHG | TEMPERATURE: 98 F | OXYGEN SATURATION: 100 % | RESPIRATION RATE: 16 BRPM

## 2024-10-23 DIAGNOSIS — I15.9 SECONDARY HYPERTENSION: Primary | ICD-10-CM

## 2024-10-23 LAB
ALBUMIN SERPL-MCNC: 4.6 G/DL (ref 3.2–4.8)
ALBUMIN/GLOB SERPL: 1.7 {RATIO} (ref 1–2)
ALP LIVER SERPL-CCNC: 79 U/L
ALT SERPL-CCNC: 16 U/L
ANION GAP SERPL CALC-SCNC: 5 MMOL/L (ref 0–18)
AST SERPL-CCNC: 30 U/L (ref ?–34)
BASOPHILS # BLD AUTO: 0.03 X10(3) UL (ref 0–0.2)
BASOPHILS NFR BLD AUTO: 0.3 %
BILIRUB SERPL-MCNC: 0.4 MG/DL (ref 0.2–1.1)
BUN BLD-MCNC: 15 MG/DL (ref 9–23)
CALCIUM BLD-MCNC: 10.1 MG/DL (ref 8.7–10.4)
CHLORIDE SERPL-SCNC: 105 MMOL/L (ref 98–112)
CO2 SERPL-SCNC: 29 MMOL/L (ref 21–32)
CREAT BLD-MCNC: 0.9 MG/DL
EGFRCR SERPLBLD CKD-EPI 2021: 67 ML/MIN/1.73M2 (ref 60–?)
EOSINOPHIL # BLD AUTO: 0.01 X10(3) UL (ref 0–0.7)
EOSINOPHIL NFR BLD AUTO: 0.1 %
ERYTHROCYTE [DISTWIDTH] IN BLOOD BY AUTOMATED COUNT: 13 %
GLOBULIN PLAS-MCNC: 2.7 G/DL (ref 2–3.5)
GLUCOSE BLD-MCNC: 133 MG/DL (ref 70–99)
HCT VFR BLD AUTO: 38.6 %
HGB BLD-MCNC: 13 G/DL
IMM GRANULOCYTES # BLD AUTO: 0.04 X10(3) UL (ref 0–1)
IMM GRANULOCYTES NFR BLD: 0.3 %
LYMPHOCYTES # BLD AUTO: 0.79 X10(3) UL (ref 1–4)
LYMPHOCYTES NFR BLD AUTO: 6.8 %
MCH RBC QN AUTO: 30.1 PG (ref 26–34)
MCHC RBC AUTO-ENTMCNC: 33.7 G/DL (ref 31–37)
MCV RBC AUTO: 89.4 FL
MONOCYTES # BLD AUTO: 0.23 X10(3) UL (ref 0.1–1)
MONOCYTES NFR BLD AUTO: 2 %
NEUTROPHILS # BLD AUTO: 10.46 X10 (3) UL (ref 1.5–7.7)
NEUTROPHILS # BLD AUTO: 10.46 X10(3) UL (ref 1.5–7.7)
NEUTROPHILS NFR BLD AUTO: 90.5 %
OSMOLALITY SERPL CALC.SUM OF ELEC: 291 MOSM/KG (ref 275–295)
PLATELET # BLD AUTO: 271 10(3)UL (ref 150–450)
POTASSIUM SERPL-SCNC: 4.2 MMOL/L (ref 3.5–5.1)
PROT SERPL-MCNC: 7.3 G/DL (ref 5.7–8.2)
RBC # BLD AUTO: 4.32 X10(6)UL
SODIUM SERPL-SCNC: 139 MMOL/L (ref 136–145)
WBC # BLD AUTO: 11.6 X10(3) UL (ref 4–11)

## 2024-10-23 PROCEDURE — 99285 EMERGENCY DEPT VISIT HI MDM: CPT

## 2024-10-23 PROCEDURE — 80053 COMPREHEN METABOLIC PANEL: CPT | Performed by: EMERGENCY MEDICINE

## 2024-10-23 PROCEDURE — 85025 COMPLETE CBC W/AUTO DIFF WBC: CPT

## 2024-10-23 PROCEDURE — 93005 ELECTROCARDIOGRAM TRACING: CPT

## 2024-10-23 PROCEDURE — 80053 COMPREHEN METABOLIC PANEL: CPT

## 2024-10-23 PROCEDURE — 70450 CT HEAD/BRAIN W/O DYE: CPT | Performed by: EMERGENCY MEDICINE

## 2024-10-23 PROCEDURE — 93010 ELECTROCARDIOGRAM REPORT: CPT

## 2024-10-23 PROCEDURE — 96374 THER/PROPH/DIAG INJ IV PUSH: CPT

## 2024-10-23 PROCEDURE — 85025 COMPLETE CBC W/AUTO DIFF WBC: CPT | Performed by: EMERGENCY MEDICINE

## 2024-10-23 RX ORDER — HYDRALAZINE HYDROCHLORIDE 20 MG/ML
10 INJECTION INTRAMUSCULAR; INTRAVENOUS ONCE
Status: COMPLETED | OUTPATIENT
Start: 2024-10-23 | End: 2024-10-23

## 2024-10-23 RX ORDER — HYDRALAZINE HYDROCHLORIDE 25 MG/1
25 TABLET, FILM COATED ORAL 2 TIMES DAILY PRN
Qty: 60 TABLET | Refills: 0 | Status: SHIPPED | OUTPATIENT
Start: 2024-10-23

## 2024-10-23 NOTE — ED PROVIDER NOTES
Patient Seen in: [facility] Emergency Department      History     Chief Complaint   Patient presents with    Dizziness    Hypertension     Stated Complaint: Dizzy, HTN systolic over 200    Subjective:   HPI      Patient is here for 2 days of feeling \"foggy\".  She denies any vertiginous sensations.  Has transient lightheadedness when she gets up.  No headache.  Slightly nauseated, no vomiting.    No chest pain, shortness of breath or palpitations.  No vision changes.  No word finding difficulty.  Has a history of dementia but is able to carry a conversation with me here now.      Objective:     Past Medical History:    Arrhythmia    Arthritis    Dementia (HCC)    Glenohumeral arthritis, left    Heart palpitations    HTN (hypertension)    Impingement syndrome of shoulder, left    Left bicipital tenosynovitis    Osteoporosis    Pituitary adenoma (HCC)    surgery 1982.     Primary osteoarthritis of both knees    PVD (posterior vitreous detachment), both eyes    Screening for malignant neoplasm of colon    Weight loss              Past Surgical History:   Procedure Laterality Date    Colonoscopy  2008    Colonoscopy N/A 10/22/2018    Procedure: COLONOSCOPY, POSSIBLE BIOPSY, POSSIBLE POLYPECTOMY 33977;  Surgeon: Valentina Roman MD;  Location: [facility], [facility]    Cyst aspiration right  1975    pt not 100% sure this even happened    Hip replacement surgery Right 2003                Social History     Socioeconomic History    Marital status:     Number of children: 0   Occupational History    Occupation: retired [employer], .    Tobacco Use    Smoking status: Former    Smokeless tobacco: Never   Vaping Use    Vaping status: Never Used   Substance and Sexual Activity    Alcohol use: Not Currently     Comment: 1 glass 2-3 x per week.     Drug use: No     Social Drivers of Health     Financial Resource Strain: Low Risk  (1/31/2022)    Received from [facility], [facility]    Overall  Financial Resource Strain (CARDIA)     Difficulty of Paying Living Expenses: Not hard at all   Food Insecurity: No Food Insecurity (1/31/2022)    Received from [facility]    Hunger Vital Sign     Worried About Running Out of Food in the Last Year: Never true     Ran Out of Food in the Last Year: Never true   Transportation Needs: No Transportation Needs (1/31/2022)    Received from [facility]    PRAPARE - Transportation     Lack of Transportation (Medical): No     Lack of Transportation (Non-Medical): No   Physical Activity: Sufficiently Active (1/31/2022)    Exercise Vital Sign     Days of Exercise per Week: 6 days     Minutes of Exercise per Session: 30 min   Stress: No Stress Concern Present (1/31/2022)    Received from [facility]    [place] [employer] - Occupational Stress Questionnaire     Feeling of Stress : Only a little   Social Connections: Moderately Isolated (1/31/2022)    Received from [facility]    Social Connection and Isolation Panel [NHANES]     Frequency of Communication with Friends and Family: Three times a week     Frequency of Social Gatherings with Friends and Family: Once a week     Attends Faith Services: Never     Active Member of Clubs or Organizations: No     Attends Club or Organization Meetings: Never     Marital Status:    Housing Stability: Low Risk  (1/31/2022)    Received from [facility]    Housing Stability Vital Sign     Unable to Pay for Housing in the Last Year: No     Number of Places Lived in the Last Year: 1     Unstable Housing in the Last Year: No                  Physical Exam     ED Triage Vitals   BP 10/23/24 1413 (!) 197/82   Pulse 10/23/24 1413 (!) 47   Resp 10/23/24 1413 16   Temp 10/23/24 1413 97.5 °F (36.4 °C)   Temp src --    SpO2 10/23/24 1413 99 %   O2 Device 10/23/24 1430  None (Room air)       Current Vitals:   Vital Signs  BP: (!) 164/68  Pulse: 52  Resp: 16  Temp: 97.5 °F (36.4 °C)  MAP (mmHg): 94    Oxygen Therapy  SpO2: 100 %  O2 Device: None (Room air)        Physical Exam    Constitutional: Awake, alert, age appearing, non-toxic  Head: Normocephalic and atraumatic.     Eyes: EOM are normal. Pupils are equal, round, and reactive to light.   Neck: Normal range of motion. Neck supple. No JVD present.     Cardiovascular: Normal rate and regular rhythm.  Normal peripheral perfusion with good color.  Pulmonary/Chest: Normal effort.  No accessory muscle use.  No clubbing, no cyanosis.  Abdominal: Soft. There is no tenderness. There is no guarding.     Musculoskeletal: No swelling, deformity or ecchymosis.    Neurological: Pt is alert and oriented to person, and place but not time or situation.  Follows simple commands.  No cranial nerve deficit.  Speech fluent not slurred      Skin: Skin is warm and dry.   Psychiatric: Normal mood and affect. Thought content normal.       ED Course     Labs Reviewed   COMP METABOLIC PANEL (14) - Abnormal; Notable for the following components:       Result Value    Glucose 133 (*)     All other components within normal limits   CBC WITH DIFFERENTIAL WITH PLATELET - Abnormal; Notable for the following components:    WBC 11.6 (*)     Neutrophil Absolute Prelim 10.46 (*)     Neutrophil Absolute 10.46 (*)     Lymphocyte Absolute 0.79 (*)     All other components within normal limits   RAINBOW DRAW BLUE     EKG    Rate, intervals and axes as noted on EKG Report.  Rate: 48  Rhythm: Sinus Rhythm  Reading: Sinus bradycardia without acute ischemia                Labs reviewed, acceptable    CT BRAIN OR HEAD (CPT=70450)    Result Date: 10/23/2024  PROCEDURE:  CT BRAIN OR HEAD (43397)  COMPARISON:  [facility] , CT, CT BRAIN OR HEAD ([id number]), 5/16/2024, 2:08 PM.  INDICATIONS:  Dizzy, HTN systolic over 200  TECHNIQUE:  Noncontrast CT scanning is performed through the  brain. Dose reduction techniques were used. Dose information is transmitted to the ACR (American College of Radiology) NRDR (National Radiology Data Registry) which includes the Dose Index Registry.  PATIENT STATED HISTORY: (As transcribed by Technologist)  Patient states dizziness, elevated BP.    FINDINGS: No evidence of intracranial hemorrhage or extra-axial fluid collection. Lucencies in the deep periventricular white matter are likely sequelae of chronic small vessel ischemic disease. Prominence of the sulci. No mass effect. Visualized portions of paranasal sinuses are unremarkable. Visualized portions of the mastoid air cells are unremarkable. Visualized portions of the orbits are unremarkable. IMPRESSION: Global parenchymal volume loss.  Sequelae of chronic small vessel ischemic disease is noted. No evidence of intracranial hemorrhage or extra-axial fluid collection.    LOCATION:  [facility]   Dictated by (CST): Bipin Campos MD on 10/23/2024 at 4:35 PM     Finalized by (CST): Bipin Campos MD on 10/23/2024 at 4:36 PM             MDM            Differential diagnoses considered: Life-threatening intracranial hemorrhage, hypertensive emergency, hypertensive slop apathy, hypertensive urgency, asymptomatic hypertension all considered      -She actually does not complain of any focal neurological deficits and does not have any on exam, correcting for her known dementia.      -Plan brain negative for hemorrhage.    -Will ambulate her and see on she feels.  She is on Toprol to suppress SVT so we will keep this but will likely add on hydralazine.  Does not seem to be symptomatic  from the hypertension.      1850  To get up and walk around, when she change position she did have some transient lightheadedness but was steady on her feet.  Did show chronotropic competence, heart rate anuel into the 60s.    Remained persistently hypertensive.  No signs of hypertensive urgency emergency.  She is given hydralazine  with good result.  Discussed with sister at bedside.  Check blood pressure twice a day, take 25 mg of hydralazine if if over 160/90.  Between the sister and the care giver, they think they can do this routinely.  Should follow-up with PCP within the next couple weeks as well.       I visualized the radiology studies, my independent interpretation: No large adrenal hemorrhage noted  *Medication management: As noted above  *Discussion of ongoing management of this patient's care included: n/a  *Comorbidities contributing to the complexity of decision making: n/a  *External charts reviewed:  Dr. Bynum's note from last month reviewed, on Toprol to suppress SVT  *Additional sources of history: n/a    Shared decision making was done by: patient, myself.          Medical Decision Making      Disposition and Plan     Clinical Impression:  1. Secondary hypertension         Disposition:  Discharge  10/23/2024  6:54 pm    Follow-up:  Steve Agrawal MD  [address] [address]  [phone]    Follow up            Medications Prescribed:  Current Discharge Medication List        START taking these medications    Details   hydrALAZINE 25 MG Oral Tab Take 1 tablet (25 mg total) by mouth 2 (two) times daily as needed (BP greater than 160/90).  Qty: 60 tablet, Refills: 0                 Supplementary Documentation:

## 2024-10-23 NOTE — ED QUICK NOTES
Walked pt down the hallway tolerated well. Gait was steady but patient reported feeling somewhat weak and dizzy.

## 2024-10-23 NOTE — ED INITIAL ASSESSMENT (HPI)
A&Ox1 (baseline, hx of dementia) bib caregiver for dizziness since Monday, HTN and one episode of vomiting today    [facility]  Patient denies any pain at this time, unable to fully assess d/t AMS    RR even/NL, speaking in full clear sentences

## 2024-10-23 NOTE — ED QUICK NOTES
Pt rounded on, back from CT. Attached to monitors, bed in low position, wheels locked. Call light within reach of pt.

## 2024-10-23 NOTE — ED QUICK NOTES
Spoke to Chelo OVALLE in person regarding pt's ambulation. Per MD, give pt ordered BP medicine and food at this time.

## 2024-10-23 NOTE — ED QUICK NOTES
Pt wheeled out by this RN. Pt cleared for discharge by Chelo OVALLE. This RN provided pt with discharge teaching, pt verbalized understanding of information. VS obtained prior to dispo - okay for discharge per MD, peripheral line removed. Confirmed pharmacy. No further questions regarding discharge.   ambulatory

## 2024-10-24 LAB
ATRIAL RATE: 48 BPM
P AXIS: 46 DEGREES
P-R INTERVAL: 198 MS
Q-T INTERVAL: 498 MS
QRS DURATION: 98 MS
QTC CALCULATION (BEZET): 444 MS
R AXIS: 46 DEGREES
T AXIS: 70 DEGREES
VENTRICULAR RATE: 48 BPM

## 2024-11-04 ENCOUNTER — APPOINTMENT (OUTPATIENT)
Dept: CT IMAGING | Facility: HOSPITAL | Age: 74
End: 2024-11-04
Attending: EMERGENCY MEDICINE
Payer: MEDICARE

## 2024-11-04 ENCOUNTER — APPOINTMENT (OUTPATIENT)
Dept: GENERAL RADIOLOGY | Facility: HOSPITAL | Age: 74
End: 2024-11-04
Attending: EMERGENCY MEDICINE
Payer: MEDICARE

## 2024-11-04 ENCOUNTER — HOSPITAL ENCOUNTER (INPATIENT)
Facility: HOSPITAL | Age: 74
LOS: 3 days | Discharge: HOME HEALTH | End: 2024-11-08
Attending: EMERGENCY MEDICINE | Admitting: INTERNAL MEDICINE
Payer: MEDICARE

## 2024-11-04 DIAGNOSIS — N30.00 ACUTE CYSTITIS WITHOUT HEMATURIA: ICD-10-CM

## 2024-11-04 DIAGNOSIS — F02.80 ALZHEIMER'S DEMENTIA, UNSPECIFIED DEMENTIA SEVERITY, UNSPECIFIED TIMING OF DEMENTIA ONSET, UNSPECIFIED WHETHER BEHAVIORAL, PSYCHOTIC, OR MOOD DISTURBANCE OR ANXIETY (HCC): ICD-10-CM

## 2024-11-04 DIAGNOSIS — E86.0 DEHYDRATION: ICD-10-CM

## 2024-11-04 DIAGNOSIS — G30.9 ALZHEIMER'S DEMENTIA, UNSPECIFIED DEMENTIA SEVERITY, UNSPECIFIED TIMING OF DEMENTIA ONSET, UNSPECIFIED WHETHER BEHAVIORAL, PSYCHOTIC, OR MOOD DISTURBANCE OR ANXIETY (HCC): ICD-10-CM
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
## 2024-11-04 DIAGNOSIS — T68.XXXA HYPOTHERMIA, INITIAL ENCOUNTER: Primary | ICD-10-CM

## 2024-11-04 DIAGNOSIS — D72.825 BANDEMIA: ICD-10-CM

## 2024-11-04 LAB
ALBUMIN SERPL-MCNC: 4.2 G/DL (ref 3.2–4.8)
ALBUMIN/GLOB SERPL: 1.5 {RATIO} (ref 1–2)
ALP LIVER SERPL-CCNC: 67 U/L
ALT SERPL-CCNC: 17 U/L
ANION GAP SERPL CALC-SCNC: 3 MMOL/L (ref 0–18)
AST SERPL-CCNC: 26 U/L (ref ?–34)
BASOPHILS # BLD AUTO: 0.05 X10(3) UL (ref 0–0.2)
BASOPHILS NFR BLD AUTO: 0.4 %
BILIRUB SERPL-MCNC: 0.3 MG/DL (ref 0.2–1.1)
BILIRUB UR QL STRIP.AUTO: NEGATIVE
BUN BLD-MCNC: 23 MG/DL (ref 9–23)
CALCIUM BLD-MCNC: 10.3 MG/DL (ref 8.7–10.4)
CHLORIDE SERPL-SCNC: 108 MMOL/L (ref 98–112)
CLARITY UR REFRACT.AUTO: CLEAR
CO2 SERPL-SCNC: 27 MMOL/L (ref 21–32)
COLOR UR AUTO: YELLOW
CREAT BLD-MCNC: 0.92 MG/DL
EGFRCR SERPLBLD CKD-EPI 2021: 65 ML/MIN/1.73M2 (ref 60–?)
EOSINOPHIL # BLD AUTO: 0.13 X10(3) UL (ref 0–0.7)
EOSINOPHIL NFR BLD AUTO: 0.9 %
ERYTHROCYTE [DISTWIDTH] IN BLOOD BY AUTOMATED COUNT: 13.2 %
GLOBULIN PLAS-MCNC: 2.8 G/DL (ref 2–3.5)
GLUCOSE BLD-MCNC: 111 MG/DL (ref 70–99)
GLUCOSE UR STRIP.AUTO-MCNC: NORMAL MG/DL
HCT VFR BLD AUTO: 37.9 %
HGB BLD-MCNC: 12.6 G/DL
IMM GRANULOCYTES # BLD AUTO: 0.08 X10(3) UL (ref 0–1)
IMM GRANULOCYTES NFR BLD: 0.6 %
KETONES UR STRIP.AUTO-MCNC: NEGATIVE MG/DL
LACTATE SERPL-SCNC: 1.3 MMOL/L (ref 0.5–2)
LEUKOCYTE ESTERASE UR QL STRIP.AUTO: 250
LYMPHOCYTES # BLD AUTO: 1.71 X10(3) UL (ref 1–4)
LYMPHOCYTES NFR BLD AUTO: 12.1 %
MCH RBC QN AUTO: 30.6 PG (ref 26–34)
MCHC RBC AUTO-ENTMCNC: 33.2 G/DL (ref 31–37)
MCV RBC AUTO: 92 FL
MONOCYTES # BLD AUTO: 0.58 X10(3) UL (ref 0.1–1)
MONOCYTES NFR BLD AUTO: 4.1 %
NEUTROPHILS # BLD AUTO: 11.59 X10 (3) UL (ref 1.5–7.7)
NEUTROPHILS # BLD AUTO: 11.59 X10(3) UL (ref 1.5–7.7)
NEUTROPHILS NFR BLD AUTO: 81.9 %
OSMOLALITY SERPL CALC.SUM OF ELEC: 290 MOSM/KG (ref 275–295)
PH UR STRIP.AUTO: 5 [PH] (ref 5–8)
PLATELET # BLD AUTO: 234 10(3)UL (ref 150–450)
POTASSIUM SERPL-SCNC: 3.7 MMOL/L (ref 3.5–5.1)
PROT SERPL-MCNC: 7 G/DL (ref 5.7–8.2)
RBC # BLD AUTO: 4.12 X10(6)UL
RBC UR QL AUTO: NEGATIVE
SODIUM SERPL-SCNC: 138 MMOL/L (ref 136–145)
SP GR UR STRIP.AUTO: 1.03 (ref 1–1.03)
TSI SER-ACNC: 0.68 UIU/ML (ref 0.55–4.78)
UROBILINOGEN UR STRIP.AUTO-MCNC: NORMAL MG/DL
WBC # BLD AUTO: 14.1 X10(3) UL (ref 4–11)

## 2024-11-04 PROCEDURE — 87184 SC STD DISK METHOD PER PLATE: CPT | Performed by: EMERGENCY MEDICINE

## 2024-11-04 PROCEDURE — 36415 COLL VENOUS BLD VENIPUNCTURE: CPT

## 2024-11-04 PROCEDURE — 87040 BLOOD CULTURE FOR BACTERIA: CPT | Performed by: EMERGENCY MEDICINE

## 2024-11-04 PROCEDURE — 93005 ELECTROCARDIOGRAM TRACING: CPT

## 2024-11-04 PROCEDURE — 99285 EMERGENCY DEPT VISIT HI MDM: CPT

## 2024-11-04 PROCEDURE — 87186 SC STD MICRODIL/AGAR DIL: CPT | Performed by: EMERGENCY MEDICINE

## 2024-11-04 PROCEDURE — 87086 URINE CULTURE/COLONY COUNT: CPT | Performed by: EMERGENCY MEDICINE

## 2024-11-04 PROCEDURE — 80053 COMPREHEN METABOLIC PANEL: CPT | Performed by: EMERGENCY MEDICINE

## 2024-11-04 PROCEDURE — 87077 CULTURE AEROBIC IDENTIFY: CPT | Performed by: EMERGENCY MEDICINE

## 2024-11-04 PROCEDURE — 71045 X-RAY EXAM CHEST 1 VIEW: CPT | Performed by: EMERGENCY MEDICINE

## 2024-11-04 PROCEDURE — 83605 ASSAY OF LACTIC ACID: CPT | Performed by: EMERGENCY MEDICINE

## 2024-11-04 PROCEDURE — 70450 CT HEAD/BRAIN W/O DYE: CPT | Performed by: EMERGENCY MEDICINE

## 2024-11-04 PROCEDURE — 84443 ASSAY THYROID STIM HORMONE: CPT | Performed by: EMERGENCY MEDICINE

## 2024-11-04 PROCEDURE — 85025 COMPLETE CBC W/AUTO DIFF WBC: CPT | Performed by: EMERGENCY MEDICINE

## 2024-11-04 PROCEDURE — 93010 ELECTROCARDIOGRAM REPORT: CPT

## 2024-11-04 PROCEDURE — 81001 URINALYSIS AUTO W/SCOPE: CPT | Performed by: EMERGENCY MEDICINE

## 2024-11-04 PROCEDURE — 96361 HYDRATE IV INFUSION ADD-ON: CPT

## 2024-11-05 PROBLEM — F02.80 ALZHEIMER'S DEMENTIA, UNSPECIFIED DEMENTIA SEVERITY, UNSPECIFIED TIMING OF DEMENTIA ONSET, UNSPECIFIED WHETHER BEHAVIORAL, PSYCHOTIC, OR MOOD DISTURBANCE OR ANXIETY (HCC): Status: ACTIVE | Noted: 2024-11-05

## 2024-11-05 PROBLEM — E86.0 DEHYDRATION: Status: ACTIVE | Noted: 2024-11-05
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
## 2024-11-05 PROBLEM — N30.00 ACUTE CYSTITIS WITHOUT HEMATURIA: Status: ACTIVE | Noted: 2024-11-05

## 2024-11-05 PROBLEM — D72.825 BANDEMIA: Status: ACTIVE | Noted: 2024-11-05

## 2024-11-05 PROBLEM — G30.9 ALZHEIMER'S DEMENTIA, UNSPECIFIED DEMENTIA SEVERITY, UNSPECIFIED TIMING OF DEMENTIA ONSET, UNSPECIFIED WHETHER BEHAVIORAL, PSYCHOTIC, OR MOOD DISTURBANCE OR ANXIETY (HCC): Status: ACTIVE | Noted: 2024-11-05

## 2024-11-05 LAB
ATRIAL RATE: 104 BPM
P AXIS: 72 DEGREES
Q-T INTERVAL: 410 MS
QRS DURATION: 130 MS
QTC CALCULATION (BEZET): 370 MS
R AXIS: 46 DEGREES
T AXIS: 76 DEGREES
VENTRICULAR RATE: 49 BPM

## 2024-11-05 PROCEDURE — 97161 PT EVAL LOW COMPLEX 20 MIN: CPT

## 2024-11-05 PROCEDURE — 97116 GAIT TRAINING THERAPY: CPT

## 2024-11-05 PROCEDURE — 92610 EVALUATE SWALLOWING FUNCTION: CPT

## 2024-11-05 PROCEDURE — 96365 THER/PROPH/DIAG IV INF INIT: CPT

## 2024-11-05 PROCEDURE — 97166 OT EVAL MOD COMPLEX 45 MIN: CPT

## 2024-11-05 PROCEDURE — 97535 SELF CARE MNGMENT TRAINING: CPT

## 2024-11-05 RX ORDER — SODIUM CHLORIDE, SODIUM LACTATE, POTASSIUM CHLORIDE, CALCIUM CHLORIDE 600; 310; 30; 20 MG/100ML; MG/100ML; MG/100ML; MG/100ML
INJECTION, SOLUTION INTRAVENOUS CONTINUOUS
Status: DISCONTINUED | OUTPATIENT
Start: 2024-11-05 | End: 2024-11-05

## 2024-11-05 RX ORDER — METOCLOPRAMIDE HYDROCHLORIDE 5 MG/ML
5 INJECTION INTRAMUSCULAR; INTRAVENOUS EVERY 8 HOURS PRN
Status: DISCONTINUED | OUTPATIENT
Start: 2024-11-05 | End: 2024-11-08

## 2024-11-05 RX ORDER — BISACODYL 10 MG
10 SUPPOSITORY, RECTAL RECTAL
Status: DISCONTINUED | OUTPATIENT
Start: 2024-11-05 | End: 2024-11-08

## 2024-11-05 RX ORDER — ACETAMINOPHEN 500 MG
500 TABLET ORAL EVERY 4 HOURS PRN
Status: DISCONTINUED | OUTPATIENT
Start: 2024-11-05 | End: 2024-11-08

## 2024-11-05 RX ORDER — POTASSIUM CHLORIDE 14.9 MG/ML
20 INJECTION INTRAVENOUS ONCE
Status: COMPLETED | OUTPATIENT
Start: 2024-11-05 | End: 2024-11-05

## 2024-11-05 RX ORDER — FLECAINIDE ACETATE 100 MG/1
100 TABLET ORAL 2 TIMES DAILY
COMMUNITY

## 2024-11-05 RX ORDER — POLYETHYLENE GLYCOL 3350 17 G/17G
17 POWDER, FOR SOLUTION ORAL DAILY PRN
Status: DISCONTINUED | OUTPATIENT
Start: 2024-11-05 | End: 2024-11-08

## 2024-11-05 RX ORDER — FLECAINIDE ACETATE 100 MG/1
100 TABLET ORAL 2 TIMES DAILY
Status: DISCONTINUED | OUTPATIENT
Start: 2024-11-05 | End: 2024-11-08

## 2024-11-05 RX ORDER — MEMANTINE HYDROCHLORIDE 10 MG/1
10 TABLET ORAL 2 TIMES DAILY
COMMUNITY

## 2024-11-05 RX ORDER — SENNOSIDES 8.6 MG
17.2 TABLET ORAL NIGHTLY PRN
Status: DISCONTINUED | OUTPATIENT
Start: 2024-11-05 | End: 2024-11-08

## 2024-11-05 RX ORDER — MEMANTINE HYDROCHLORIDE 5 MG/1
10 TABLET ORAL 2 TIMES DAILY
Status: DISCONTINUED | OUTPATIENT
Start: 2024-11-05 | End: 2024-11-08

## 2024-11-05 RX ORDER — ONDANSETRON 2 MG/ML
4 INJECTION INTRAMUSCULAR; INTRAVENOUS EVERY 6 HOURS PRN
Status: DISCONTINUED | OUTPATIENT
Start: 2024-11-05 | End: 2024-11-08

## 2024-11-05 RX ORDER — HYDRALAZINE HYDROCHLORIDE 25 MG/1
25 TABLET, FILM COATED ORAL 2 TIMES DAILY PRN
Status: DISCONTINUED | OUTPATIENT
Start: 2024-11-05 | End: 2024-11-08

## 2024-11-05 RX ORDER — DONEPEZIL HYDROCHLORIDE 5 MG/1
10 TABLET, FILM COATED ORAL NIGHTLY
Status: DISCONTINUED | OUTPATIENT
Start: 2024-11-05 | End: 2024-11-08

## 2024-11-05 RX ORDER — HEPARIN SODIUM 5000 [USP'U]/ML
5000 INJECTION, SOLUTION INTRAVENOUS; SUBCUTANEOUS EVERY 8 HOURS SCHEDULED
Status: DISCONTINUED | OUTPATIENT
Start: 2024-11-05 | End: 2024-11-08

## 2024-11-05 RX ORDER — SODIUM PHOSPHATE, DIBASIC AND SODIUM PHOSPHATE, MONOBASIC 7; 19 G/230ML; G/230ML
1 ENEMA RECTAL ONCE AS NEEDED
Status: DISCONTINUED | OUTPATIENT
Start: 2024-11-05 | End: 2024-11-08

## 2024-11-05 NOTE — OCCUPATIONAL THERAPY NOTE
OCCUPATIONAL THERAPY EVALUATION - INPATIENT     Room Number: 8622/8622-A  Evaluation Date: 11/5/2024  Type of Evaluation: Initial  Presenting Problem: Hypothermia, intial encounter    Physician Order: IP Consult to Occupational Therapy  Reason for Therapy: ADL/IADL Dysfunction and Discharge Planning    OCCUPATIONAL THERAPY ASSESSMENT   Patient is currently functioning near baseline with toileting, lower body dressing, bed mobility, transfers, static sitting balance, dynamic sitting balance, static standing balance, dynamic standing balance, maintaining seated position, functional standing tolerance, energy conservation strategies, and aerobic capacity. Prior to admission, patient's baseline is poor historian due to history of dementia, however reports typically IND with ADLs with no use of assistive device for mobility.  Patient is requiring contact-guard assist as a result of the following impairments: impaired standing balance, impaired motor planning, cognitive deficits (hx of dementia), and decreased insight to deficits. Occupational Therapy will continue to follow for duration of hospitalization.    Patient will benefit from continued skilled OT Services at discharge to promote prior level of function and safety with additional support and return home with home health OT    History Related to Current Admission: Patient is a 74 year old female admitted on 11/4/2024 with Presenting Problem: Hypothermia, intial encounter. Co-Morbidities : dementia, PVD, osteoporosis, HTN, MAKENZIE    WEIGHT BEARING RESTRICTION       Recommendations for nursing staff:   Transfers: up x 1 assist, RW, CGA  Toileting location: Toilet    EVALUATION SESSION:  Patient Start of Session: Semi supine in bed    FUNCTIONAL TRANSFER ASSESSMENT  Sit to Stand: Edge of Bed  Edge of Bed: Contact Guard Assist  Toilet Transfer: Contact Guard Assist    BED MOBILITY  Supine to Sit : Supervision  Sit to Supine (OT): Supervision  Scooting:  Supervision    BALANCE ASSESSMENT  Static Sitting: Supervision  Static Standing: Contact Guard Assist  Dynamic Sitting: Supervision  Dynamic Standing: Contact Guard Assist    FUNCTIONAL ADL ASSESSMENT  Eating: Independent  LB Dressing Seated: Supervision (donned shoes seated EOB)  Toileting Seated: Supervision    ACTIVITY TOLERANCE: WFL, however pt appeared slightly unsteady on feet. Pt utilized RW for stability during ambulation and standing activity.  Pulse: 57        BP: 132/70  BP Location: Left arm  BP Method: Automatic  Patient Position: Sitting    O2 SATURATIONS       COGNITION  Arousal/Alertness:  appropriate responses to stimuli  Orientation Level:  oriented to place with list of choices, and oriented to person  Following Commands: follows one step commands with repetition and with increased time    Upper Extremity   ROM: within functional limits  Strength: within functional limits  Coordination  Gross motor: WFL  Fine motor: WFL  Sensation: no c/o numbness or tingling    EDUCATION PROVIDED  Patient Education : Role of Occupational Therapy; Plan of Care; Functional Transfer Techniques; Fall Prevention; Posture/Positioning; Energy Conservation; Proper Body Mechanics  Patient's Response to Education: Verbalized Understanding; Requires Further Education    Equipment used: RW  Would benefit from additional trial yes     Therapist comments: RN cleared pt for session. Pt alert to self, and place when given list of choices. Pt conversational, however, not directly answering questions. Pt observed to don shoes sitting EOB and able to tie shoes with supervision. Pt observed to have an unsteady gait when up in standing; pt utilized RW for additional support and stability during standing tasks. Pt returned to chair at end of session with alarm set and lunch set up in front of pt on tray; Pt observed to feed self IND and was thoroughly pleased with her meal.    Patient End of Session: Up in chair;Needs met;Call light  within reach;RN aware of session/findings;All patient questions and concerns addressed;Hospital anti-slip socks;Alarm set    OCCUPATIONAL PROFILE    HOME SITUATION  Type of Home: House  Home Layout: Two level  Lives With: Spouse    Toilet and Equipment: Comfort height toilet;Standard height toilet  Shower/Tub and Equipment: Tub-shower combo       Occupation/Status: Retired  Hand Dominance: Left  Drives: No  Patient Regularly Uses: None    Prior Level of Function: Per EMR report, pt and pt spouse both have dementia. Attempted to call pt sister for PLOF information who is listed as primary contact, however pt sister did not have time to talk at this time. Per pt report, pt is IND with dressing, showering, and toileting, doesn't typically use any assistive devices for mobility.    This writer and PT talked to pt sister later on in afternoon: per pt sister, pt is typically IND with ADLs and is typically \"pretty spry\". Pt cognitive status is hit or miss, occasionally has trouble finding words or loses train of thought. Sister somewhat assists with medication management; however reported that pt medication has been missing recently and pt sister is unsure of where it goes. Pt sister reports pt looked at Assisted Living Facility ([facility]) last week and is considering placement at an shelter. Pt sister also reports pt has a home health aide that helps with laundry, cooking, cleaning, and driving pt around when needed.     SUBJECTIVE   \"They're easy to keep going\" re: pt shoes (Keds)    PAIN ASSESSMENT  Ratin          OBJECTIVE  Precautions: Bed/chair alarm  Fall Risk: High fall risk    ASSESSMENTS    AM-PAC ‘6-Clicks’ Inpatient Daily Activity Short Form  -   Putting on and taking off regular lower body clothing?: None  -   Bathing (including washing, rinsing, drying)?: A Little  -   Toileting, which includes using toilet, bedpan or urinal? : A Little  -   Putting on and taking off regular upper body  clothing?: None  -   Taking care of personal grooming such as brushing teeth?: None  -   Eating meals?: None    AM-PAC Score:  Score: 22  Approx Degree of Impairment: 25.8%  Standardized Score (AM-PAC Scale): 47.1    ADDITIONAL TESTS     NEUROLOGICAL FINDINGS      COGNITION ASSESSMENTS     PLAN  OT Device Recommendations: TBD  OT Treatment Plan: Balance activities;Energy conservation/work simplification techniques;ADL training;IADL training;Functional transfer training;UE strengthening/ROM;Endurance training;Patient/Family education;Patient/Family training;Cognitive reorientation;Equipment eval/education;Compensatory technique education;Continued evaluation  Rehab Potential : Good  Frequency: 3-5x/week  Number of Visits to Meet Established Goals: 3    ADL Goals   Patient will perform grooming: with supervision and while standing at sink  Patient will perform toileting: with supervision    Functional Transfer Goals  Patient will transfer from sit to stand:  with supervision  Patient will transfer to toilet:  with supervision      Patient Evaluation Complexity Level:   Occupational Profile/Medical History MODERATE - Expanded review of history including review of medical or therapy record   Specific performance deficits impacting engagement in ADL/IADL MODERATE  3 - 5 performance deficits   Client Assessment/Performance Deficits MODERATE - Comorbidities and min to mod modifications of tasks    Clinical Decision Making MODERATE - Analysis of occupational profile, detailed assessments, several treatment options    Overall Complexity MODERATE     OT Session Time: 25 minutes  Self-Care Home Management: 15 minutes

## 2024-11-05 NOTE — ED QUICK NOTES
Assume pt care, report from GABRIELLE Berg. Pt resting in bed, family at bedside. She denies pain. Pt in no acute distress. Dr Carter at bedside updating pt and family of the POC

## 2024-11-05 NOTE — H&P
[facility] Hospitalist History and Physical      Chief Complaint   Patient presents with    Dehydration    Fall        PCP: Steve Agrawal MD      History of Present Illness: Patient is a 74 year old female with PMH sig for dementia, hypertension, history of SVT scented with falls.  She lives with her  and they both have Alzheimer's.  Apparently family watches them through a ring camera and they saw her falling.  Was brought to the ER and it seems like she was covered in stool and was very disheveled.  She was not alert and was only responding to painful stimuli.  She was found to be hypothermic and bradycardic.  Pressure was 95.8, heart rate was in the 40s.  With leukocytosis and dirty urinalysis.  X-ray and CT of the brain unremarkable.  Patient on ceftriaxone and admitted for further evaluation and treatment.    Past Medical History:    Arrhythmia    Arthritis    Dementia (HCC)    Glenohumeral arthritis, left    Heart palpitations    HTN (hypertension)    Impingement syndrome of shoulder, left    Left bicipital tenosynovitis    Osteoporosis    Pituitary adenoma (HCC)    surgery 1982.     Primary osteoarthritis of both knees    PVD (posterior vitreous detachment), both eyes    Screening for malignant neoplasm of colon    Weight loss      Past Surgical History:   Procedure Laterality Date    Colonoscopy  2008    Colonoscopy N/A 10/22/2018    Procedure: COLONOSCOPY, POSSIBLE BIOPSY, POSSIBLE POLYPECTOMY 75028;  Surgeon: Valentina Roman MD;  Location: [facility], [facility]    Cyst aspiration right  1975    pt not 100% sure this even happened    Hip replacement surgery Right 2003        ALL:  Allergies[1]     No current outpatient medications on file.       Social History     Tobacco Use    Smoking status: Former    Smokeless tobacco: Never   Substance Use Topics    Alcohol use: Not Currently     Comment: 1 glass 2-3 x per week.         Fam Hx  Family History   Problem Relation Age of Onset    Heart Attack  Father     Colon Cancer Father     Heart Disorder Father     Cancer Father         prostate    Breast Cancer Mother     Hypertension Mother        Review of Systems  Comprehensive ROS reviewed and negative except for what is stated in HPI.      OBJECTIVE:  /62 (BP Location: Left arm)   Pulse 61   Temp 97.9 °F (36.6 °C) (Oral)   Resp 17   Ht 5' 4\" (1.626 m)   Wt 126 lb (57.2 kg)   SpO2 98%   BMI 21.63 kg/m²   General:  Alert, no distress, appears stated age. AAOx1   Head:  Normocephalic, without obvious abnormality, atraumatic.   Eyes:  Sclera anicteric, No conjunctival pallor, EOMs intact.    Nose: Nares normal. Septum midline. Mucosa normal. No drainage.   Throat: Lips, mucosa, and tongue normal. Teeth and gums normal.   Neck: Supple, symmetrical, trachea midline, no cervical or supraclavicular lymph adenopathy, thyroid: no enlargment/tenderness/nodules appreciated   Lungs:   Clear to auscultation bilaterally. Normal effort   Chest wall:  No tenderness or deformity.   Heart:  Regular rate and rhythm, S1, S2 normal, no murmur, rub or gallop appreciated   Abdomen:   Soft, non-tender. Bowel sounds normal. No masses,  No organomegaly. Non distended   Extremities: Extremities normal, atraumatic, no cyanosis or edema.   Skin: Skin color, texture, turgor normal. No rashes or lesions.    Neurologic: Normal strength, no focal deficit appreciated     Data Review:    LABS:   Lab Results   Component Value Date    WBC 14.1 11/04/2024    HGB 12.6 11/04/2024    HCT 37.9 11/04/2024    .0 11/04/2024    CREATSERUM 0.92 11/04/2024    BUN 23 11/04/2024     11/04/2024    K 3.7 11/04/2024     11/04/2024    CO2 27.0 11/04/2024     11/04/2024    CA 10.3 11/04/2024    ALB 4.2 11/04/2024    ALKPHO 67 11/04/2024    BILT 0.3 11/04/2024    TP 7.0 11/04/2024    AST 26 11/04/2024    ALT 17 11/04/2024    TSH 0.681 11/04/2024       CXR: All imaging personally reviewed.      Radiology: XR CHEST AP PORTABLE   (CPT=71045)    Result Date: 11/4/2024  PROCEDURE:  XR CHEST AP PORTABLE  (CPT=71045)  TECHNIQUE:  AP chest radiograph was obtained.  COMPARISON:  [facility] , XR, XR CHEST AP PORTABLE  (CPT=71010), 10/30/2017, 2:33 PM.  INDICATIONS:  multiple falls  PATIENT STATED HISTORY: (As transcribed by Technologist)  Patient here for multiple falls. Per patient, no current chest complaints.    FINDINGS:  The lungs are clear.  Cardiomediastinal silhouette and vascularity are unremarkable.  No significant osseous abnormalities.            CONCLUSION:  Exam is within normal limits.   LOCATION:  [facility]      Dictated by (CST): Eduardo Ortez DO on 11/04/2024 at 11:54 PM     Finalized by (CST): Eduardo Ortez DO on 11/04/2024 at 11:55 PM       CT BRAIN OR HEAD (CPT=70450)    Result Date: 11/4/2024  PROCEDURE:  CT BRAIN OR HEAD (31875)  COMPARISON:  [facility] , CT, CT BRAIN OR HEAD (30148), 10/23/2024, 4:21 PM.  INDICATIONS:  Patient presents with altered mental status and multiple falls.  TECHNIQUE:  Noncontrast CT scanning is performed through the brain. Dose reduction techniques were used. Dose information is transmitted to the ACR (American College of Radiology) NRDR (National Radiology Data Registry) which includes the Dose Index Registry.  PATIENT STATED HISTORY: (As transcribed by Technologist)  multiple falls, ams    FINDINGS:  VENTRICLES/SULCI:  Ventricles and sulci are prominent INTRACRANIAL:  No acute hemorrhage, mass effect or midline shift.  Stable mild diffuse atrophy and white matter disease.  No cerebral edema.  No hyperdense intracranial vessels. SINUSES:           No sign of acute sinusitis.  MASTOIDS:          No sign of acute inflammation. SKULL:             No evidence for fracture or osseous abnormality. OTHER:             None.            CONCLUSION:  1. No acute process. 2. Stable atrophy and white matter disease.    LOCATION:  [facility]   Dictated by (CST): Eduardo Ortez DO on 11/04/2024 at 11:30 PM     Finalized by  (CST): Eduardo Ortez DO on 11/04/2024 at 11:31 PM       CT BRAIN OR HEAD (CPT=70450)    Result Date: 10/23/2024            PROCEDURE:  CT BRAIN OR HEAD (72014)  COMPARISON:  [facility] , CT, CT BRAIN OR HEAD (92479), 5/16/2024, 2:08 PM.  INDICATIONS:  Dizzy, HTN systolic over 200  TECHNIQUE:  Noncontrast CT scanning is performed through the brain. Dose reduction techniques were used. Dose information is transmitted to the ACR (American College of Radiology) NRDR (National Radiology Data Registry) which includes the Dose Index Registry.  PATIENT STATED HISTORY: (As transcribed by Technologist)  Patient states dizziness, elevated BP.    FINDINGS: No evidence of intracranial hemorrhage or extra-axial fluid collection. Lucencies in the deep periventricular white matter are likely sequelae of chronic small vessel ischemic disease. Prominence of the sulci. No mass effect. Visualized portions of paranasal sinuses are unremarkable. Visualized portions of the mastoid air cells are unremarkable. Visualized portions of the orbits are unremarkable. IMPRESSION: Global parenchymal volume loss.  Sequelae of chronic small vessel ischemic disease is noted. No evidence of intracranial hemorrhage or extra-axial fluid collection.    LOCATION:  [facility]   Dictated by (CST): Bipin Campos MD on 10/23/2024 at 4:35 PM     Finalized by (CST): Bipin Campos MD on 10/23/2024 at 4:36 PM          Assessment/Plan:     74 year old female with PMH sig for dementia, hypertension, history of SVT scented with falls.     Acute UTI  - cont ceftriaxone  - gentle fluids  - await cultures    Metabolic Toxic encephalopathy leading to falls/weakness  - from above, monitor  - CTOH reviewed    Essential HTN  - metoprolol    Hx SVT  - flecainide     Dementia  - donepezil, memantine     FEN: regular diet, PT/OT  Proph: SCDs, lovenox    Dispo - SW to arrange for [facility]     Outpatient records or previous hospital records reviewed.   [facility] hospitalist to continue  to follow patient while in house      Javed Parker MD  [facility] Hospitalist  Message over [email or web address]  Pager: [phone]        **Certification      PHYSICIAN Certification of Need for Inpatient Hospitalization - Initial Certification    Patient will require inpatient services that will reasonably be expected to span two midnight's based on the clinical documentation in H+P.   Based on patients current state of illness, I anticipate that, after discharge, patient will require TBD.         [1] No Known Allergies

## 2024-11-05 NOTE — ED PROVIDER NOTES
Patient Seen in: [facility] Emergency Department      History     Chief Complaint   Patient presents with    Dehydration    Fall     Stated Complaint: multiple falls    Subjective:   HPI      74-year-old female who was brought by ambulance and apparently patient and  of Alzheimer's.  Patient's family was watching them on the ring camera and the patient fell.  Patient arrives covered in stool and appears very disheveled she is ANO x 0 and just response to painful stimuli.  She was also found to be hypothermic and bradycardic in need of immediate intervention.    Objective:     No pertinent past medical history.            No pertinent past surgical history.              No pertinent social history.                Physical Exam     ED Triage Vitals   BP 11/04/24 2123 (!) 177/74   Pulse 11/04/24 2121 51   Resp 11/04/24 2121 20   Temp 11/04/24 2135 (!) 95.8 °F (35.4 °C)   Temp src 11/04/24 2135 Rectal   SpO2 11/04/24 2121 96 %   O2 Device 11/04/24 2121 None (Room air)       Current Vitals:   Vital Signs  BP: (!) 188/75  Pulse: 61  Resp: 16  Temp: 97.7 °F (36.5 °C)  Temp src: Oral  MAP (mmHg): (!) 109    Oxygen Therapy  SpO2: 99 %  O2 Device: None (Room air)  Pulse Oximetry Type: Continuous        Physical Exam    Vital signs reviewed  General appearance: Patient is arousable to painful stimuli disheveled covered in feces  HEENT: Pupils equal react to light extraocular muscles intact no scleral icterus, mucous membranes are dry there is no erythema or exudate in the posterior pharynx  Neck: Supple no JVD no lymphadenopathy no meningismus no carotid bruit  CV: Bradycardic, no murmur no rub  Respiratory: Clear to auscultation bilaterally no crackles no wheezes no accessory muscle use  Abdomen: Soft nontender nondistended, no rebound no guarding  no hepatosplenomegaly bowel sounds are present , no pulsatile mass  Extremities: No clubbing cyanosis or edema 2+ distal pulses.  Neuro: Patient response to painful  stimuli but not answering any questions      ED Course     Labs Reviewed   COMP METABOLIC PANEL (14) - Abnormal; Notable for the following components:       Result Value    Glucose 111 (*)     All other components within normal limits   CBC WITH DIFFERENTIAL WITH PLATELET - Abnormal; Notable for the following components:    WBC 14.1 (*)     Neutrophil Absolute Prelim 11.59 (*)     Neutrophil Absolute 11.59 (*)     All other components within normal limits   URINALYSIS WITH CULTURE REFLEX - Abnormal; Notable for the following components:    Protein Urine Trace (*)     Nitrite Urine 2+ (*)     Leukocyte Esterase Urine 250 (*)     WBC Urine 21-50 (*)     Bacteria Urine Rare (*)     Squamous Epi. Cells Few (*)     All other components within normal limits   LACTIC ACID, PLASMA - Normal   TSH W REFLEX TO FREE T4 - Normal   RAINBOW DRAW LAVENDER   RAINBOW DRAW LIGHT GREEN   RAINBOW DRAW BLUE   BLOOD CULTURE   BLOOD CULTURE   URINE CULTURE, ROUTINE     EKG    Rate, intervals and axes as noted on EKG Report.  Rate: 49  Rhythm: Sinus Rhythm  Reading: Sinus bradycardia         XR CHEST AP PORTABLE  (CPT=71045)    Result Date: 11/4/2024  CONCLUSION:  Exam is within normal limits.   LOCATION:  [facility]      Dictated by (CST): Eduardo Ortez DO on 11/04/2024 at 11:54 PM     Finalized by (CST): Eduardo Ortez DO on 11/04/2024 at 11:55 PM       CT BRAIN OR HEAD (CPT=70450)    Result Date: 11/4/2024  CONCLUSION:  1. No acute process. 2. Stable atrophy and white matter disease.    LOCATION:  [facility]   Dictated by (CST): Eduardo Ortez DO on 11/04/2024 at 11:30 PM     Finalized by (CST): Eduardo Ortez DO on 11/04/2024 at 11:31 PM             Patient was evaluated the emergency department had a CBC chemistry urinalysis lactic acid and a thyroid along with blood cultures.  Will be given some warmed IV saline put on I warming blanket and continue rectal temps.     Patient's temperature improved.  Urinalysis showed 2+ nitrates leukoesterase  and a white count of 14,000.  She was given a dose of IV antibiotics.  Was given IV fluids.  Lactic acid was normal.  She did warm up after the blanket and became much more alert and orientated.  Do feel patient should be admitted for IV antibiotics and further monitoring as she came covered in feces and was completely out of it however upon my reexamination she was much more alert and looked much better discussed case with the hospitalist who agrees with plan  MDM      Differential diagnosis reflecting the complexity of care include: Sepsis, urinary tract infection, dementia, failure to thrive    Comorbidities that add complexity to management include: Dementia      Discussions of management was done with: Hospitalist was contacted and agrees with plan    My independent interpretation of studies of: Chest x-ray unremarkable exam within normal limits.  CT brain no acute process stable atrophy and white matter disease      Social determinants of health that affect care: Patient lives with her demented  and do not feel living situation is good at this point for her    Shared decision making was done by myself and patient's daughter.  Patient will be admitted she was doing much better after IV fluids and antibiotics.    Patient was evaluated in the emergency department and at this point patient will need admission for further management of medical condition.  Patient was stable in the emergency department and will be transferred to floor for further definitive care.  Patient's questions were answered.    This note was prepared using Dragon Medical voice recognition dictation software. As a result errors may occur. When identified these errors have been corrected. While every attempt is made to correct errors during dictation discrepancies may still exist        Admission disposition: 11/5/2024  1:57 AM           Medical Decision Making      Disposition and Plan     Clinical Impression:  1. Hypothermia, initial  encounter    2. Fall, initial encounter    3. Alzheimer's dementia, unspecified dementia severity, unspecified timing of dementia onset, unspecified whether behavioral, psychotic, or mood disturbance or anxiety (HCC)    4. Bandemia    5. Acute cystitis without hematuria    6. Dehydration         Disposition:  Admit  11/5/2024  1:57 am    Follow-up:  No follow-up provider specified.        Medications Prescribed:  Current Discharge Medication List              Supplementary Documentation:         Hospital Problems       Present on Admission  Date Reviewed: 9/29/2023            ICD-10-CM Noted POA    * (Principal) Hypothermia, initial encounter T68.XXXA 11/4/2024 Unknown    Acute cystitis without hematuria N30.00 11/5/2024 Unknown    Alzheimer's dementia, unspecified dementia severity, unspecified timing of dementia onset, unspecified whether behavioral, psychotic, or mood disturbance or anxiety (HCC) G30.9, F02.80 11/5/2024 Unknown    Bandemia D72.825 11/5/2024 Unknown    Dehydration E86.0 11/5/2024 Unknown    Fall, initial encounter W19.XXXA 11/5/2024 Unknown

## 2024-11-05 NOTE — ED QUICK NOTES
Patient arrived [id number] in stool from waist down. Patient had julien in front of vagina. Patient cleaned with asssist of 2x tech. New diaper applied.

## 2024-11-05 NOTE — ED INITIAL ASSESSMENT (HPI)
Pt to ER with complaints of falls. Ems reports patient and  have alzeheimers. Reports family was watching them on ring camera and patient fell. Patient arrives A&ox0

## 2024-11-05 NOTE — ED QUICK NOTES
Rounding Completed      Elimination needs assessed.  Provided Perineal care, bed change, warm blankets.    Bed is locked and in lowest position. Call light within reach.

## 2024-11-05 NOTE — PHYSICAL THERAPY NOTE
PHYSICAL THERAPY EVALUATION - INPATIENT     Room Number: 8622/8622-A  Evaluation Date: 11/5/2024  Type of Evaluation: Initial  Physician Order: PT Eval and Treat    Presenting Problem: fall, hypothermia, bradycardia  Co-Morbidities : dementia, PVD, osteoporosis, HTN, MAKENZIE  Reason for Therapy: Mobility Dysfunction and Discharge Planning    PHYSICAL THERAPY ASSESSMENT   Patient is a 74 year old female admitted 11/4/2024 for fall, hypothermia, bradycardia.  Prior to admission, patient's baseline is independent with no AD.  Patient is currently functioning below baseline with bed mobility, transfers, gait, and stair negotiation.  Patient is requiring minimal assist as a result of the following impairments: decreased functional strength, impaired   balance, impaired motor planning, decreased muscular endurance, and cognitive deficits (dementia).  Physical Therapy will continue to follow for duration of hospitalization.    Patient will benefit from continued skilled PT Services at discharge to promote prior level of function and safety with additional support and return home with home health PT.    PLAN DURING HOSPITALIZATION  Nursing Mobility Recommendation : 1 Assist  PT Device Recommendation: Rolling walker;Gait belt  PT Treatment Plan: Bed mobility;Endurance;Energy conservation;Patient education;Family education;Gait training;Strengthening;Transfer training;Balance training  Rehab Potential : Good  Frequency (Obs): 3-5x/week     CURRENT GOALS    Goal #1 Patient is able to demonstrate supine - sit EOB @ level: supervision     Goal #2 Patient is able to demonstrate transfers Sit to/from Stand at assistance level: supervision     Goal #3 Patient is able to ambulate 200 feet with assist device:  LRAD  at assistance level: supervision     Goal #4 Consider stair goal as appropriate   Goal #5    Goal #6    Goal Comments: Goals established on 11/5/2024      PHYSICAL THERAPY MEDICAL/SOCIAL HISTORY  History related to current  admission: Patient is a 74 year old female admitted on 2024 from home for fall, hypothermia, bradycardia.    HOME SITUATION  Type of Home: House  Home Layout: Two level                     Lives With: Spouse    Drives: No   Patient Regularly Uses: None      Prior Level of Gray: Pt is a poor historian due to history of dementia. Per chart review, per EMS report pt and spouse both have dementia. Attempted to call pt's sister listed, however pt's sister unable to talk. Pt reports she lives in a 2 level home with her spouse and ambulates with no AD and was independent for ADLs.     Pt's sister present in pt's room later in the day. Pt's sister confirms the patient lives with her spouse in a 2 level house and typically the pt is independently with ADLs and ambulates with no AD. Pt's sister reports there is a part time caregiver that assists with IADLs such as laundry, cooking, cleaning, and driving. Pt's sister fills a time released pill dispenser for the pt, however, reports that days/weeks worth of medication is \"going missing\". Pt's sister has started to look into assisted living vs memory care for the pt. SW updated.     SUBJECTIVE  Pt pleasant and cooperative    OBJECTIVE  Precautions: Bed/chair alarm  Fall Risk: High fall risk    WEIGHT BEARING RESTRICTION     PAIN ASSESSMENT  Ratin          COGNITION  Orientation Level:  oriented to person, disoriented to time, disoriented to situation, and able to state she was at the hospital when given choices  Following Commands:  follows one step commands with repetition  Safety Judgement:  decreased awareness of need for assistance and decreased awareness of need for safety  Awareness of Errors:  assistance required to identify errors made, assistance required to correct errors made, and decreased awareness of errors     RANGE OF MOTION AND STRENGTH ASSESSMENT  Upper extremity ROM and strength are within functional limits     Lower extremity ROM is within  functional limits     Lower extremity strength is within functional limits     BALANCE  Static Sitting: Good  Dynamic Sitting: Good  Static Standing: Poor +  Dynamic Standing: Poor +    ADDITIONAL TESTS                                    ACTIVITY TOLERANCE   Vitals stable, denies dizziness                      O2 WALK       NEUROLOGICAL FINDINGS                        AM-PAC '6-Clicks' INPATIENT SHORT FORM - BASIC MOBILITY  How much difficulty does the patient currently have...  Patient Difficulty: Turning over in bed (including adjusting bedclothes, sheets and blankets)?: None   Patient Difficulty: Sitting down on and standing up from a chair with arms (e.g., wheelchair, bedside commode, etc.): A Little   Patient Difficulty: Moving from lying on back to sitting on the side of the bed?: None   How much help from another person does the patient currently need...   Help from Another: Moving to and from a bed to a chair (including a wheelchair)?: A Little   Help from Another: Need to walk in hospital room?: A Little   Help from Another: Climbing 3-5 steps with a railing?: A Lot     AM-PAC Score:  Raw Score: 19   Approx Degree of Impairment: 41.77%   Standardized Score (AM-PAC Scale): 45.44   CMS Modifier (G-Code): CK    FUNCTIONAL ABILITY STATUS  Gait Assessment   Functional Mobility/Gait Assessment  Gait Assistance: Minimum assistance  Distance (ft): 100  Assistive Device: None;Rolling walker  Pattern:  (pt significantly unsteady and requires assist for RW management)    Skilled Therapy Provided: Per RN okay to with pt. Pt received in supine and was agreeable to PT session.     Bed Mobility:  Rolling: NT  Supine to sit: mod ind   Sit to supine: NT     Transfer Mobility:  Sit to stand: CGA   Stand to sit: CGA  Gait = pt ambulated with no AD and min A for balance. Pt ambulated with RW and min A and required assist for RW management     Therapist's Comments: Pt educated on role of therapy, goals for session, safety, fall  prevention, and activity recommendations.     Exercise/Education Provided:  Bed mobility  Energy conservation  Functional activity tolerated  Gait training  Posture  Strengthening  Transfer training    Patient End of Session: Up in chair;Needs met;Call light within reach;RN aware of session/findings;All patient questions and concerns addressed;Alarm set (shoes donned)      Patient Evaluation Complexity Level:  History Moderate - 1 or 2 personal factors and/or co-morbidities   Examination of body systems Low -  addressing 1-2 elements   Clinical Presentation Low- Stable   Clinical Decision Making Low Complexity       PT Session Time: 30 minutes  Gait Trainin minutes

## 2024-11-05 NOTE — CM/SW NOTE
11/05/24 1500   CM/SW Referral Data   Referral Source Social Work (self-referral)   Reason for Referral Discharge planning   Informant Sibling   Medical Hx   Does patient have an established PCP? Yes   Patient Info   Patient's Current Mental Status at Time of Assessment Confused or unable to complete assessment;Memory Impairments   Patient's Home Environment House   Patient lives with Spouse/Significant other;Other  ([facility] Caregiver 5 hours per day 3 days per week)   Patient Status Prior to Admission   Services in place prior to admission alf Home Care   alf Home Care Provider Private Duty   Discharge Needs   Anticipated D/C needs Caregiver services       HOME SITUATION  Type of Home: House  Home Layout: Two level  Lives With: Spouse    Drives: No   Patient Regularly Uses: None       Prior Level of [place]: Pt is a poor historian due to history of dementia. Per chart review, per EMS report pt and spouse both have dementia. Attempted to call pt's sister listed, however pt's sister unable to talk. Pt reports she lives in a 2 level home with her spouse and ambulates with no AD and was independent for ADLs.      Pt's sister present in pt's room later in the day. Pt's sister confirms the patient lives with her spouse in a 2 level house and typically the pt is independently with ADLs and ambulates with no AD. Pt's sister reports there is a part time caregiver that assists with IADLs such as laundry, cooking, cleaning, and driving. Pt's sister fills a time released pill dispenser for the pt, however, reports that days/weeks worth of medication is \"going missing\". Pt's sister has started to look into assisted living vs memory care for the pt. SW updated.     Per PT Noelle ^^    DARRIN met with pt and sister, Leana, at bedside to discuss discharge planning. Pt not engaged in conversation. SW gathered information from Leana. Leana stated that she lives in [place] and visits with pt and spouse when she  can. Leana states that she helps with medications, but noted that pt has been most likely been taking more medications than what is left out in the pill box. Pt's spouse also has underlying dementia. Pt is mobile in the home. Leana states that they do not use the oven/stove and uses microwaves to cook meals. Pt has another sister named Georgiana that lives in [place] who is assisting with pt as able. Leana states that they have a video camera that they watch pt and spouse on. Pt and spouse also have a caregiver through [facility] that come 5 hours per day and 3 days per week to help with ADLs and preparing meals. DARRIN stated that it is encouraged and highly recommended that pt has 24 hour supervision at discharge. DARRIN placed call to Georgiana: [phone]. Spoke with Georgiana to provide update and also explained the necessity for 24 hour supervision. Georgiana is agreeable. Georgiana also stated that they have been exploring [facility] for both pt and spouse. DARRIN will reach out to [facility] liaison for assistance with providing family with [facility] information and contacting Georgiana and Leana.     DARRIN called [facility]: [phone] to inform of the need for pt to have 24 hour supervision at discharge. DARRIN requested a call back from [facility] for updates.     DARRIN placed call to Polly at [facility] to make aware of new referral. She will reach out to Georgiana to offer assistance with [facility] placement.     Marizol MICHEL, SHARATHW  Discharge Planner

## 2024-11-05 NOTE — SLP NOTE
ADULT SWALLOWING EVALUATION    ASSESSMENT    ASSESSMENT/OVERALL IMPRESSION:  Order received for bedside swallowing evaluation. Pt presented to [facility] from home following a witnessed fall. Pmhx includes but not limited to Alzheimer's dementia. CT revealed no acute process; CXR exam within normal limits. See full imaging results below. Pt failed RN dysphagia screening due to gurgly vocal quality when drinking water.    Pt found lying down in bed; alert and participatory; able to follow all commands for testing. Pt denies past swallowing difficulties, though appears to be a questionable historian. [facility] intact and functional dentition with a clean and moist oral cavity. No oral motor deficits observed. Volitional swallow elicited. Slightly wet vocal quality prior to po presentations that was completely cleared with a productive cough. Head of bed elevated upright and midline; observed pt self-feed po trials of thin liquids via cup and straw; puree and hard solid. Adequate retrieval and containment of bolus, complete mastication, timely oral transit, and complete clearing of the oral cavity. Pharyngeal response appeared timely; hyolaryngeal elevation appeared to be of adequate strength upon palpation. Clear vocal quality following every po trial.    Swallow mechanism appears to be WFL- no clinical s/s of aspiration observed.    Discussed results, diet recommendations, and aspiration precautions with patient, though question her level of understanding. Will follow up to ensure safety with diet and educate pt on compensatory strategies/swallow precautions. RN aware of results and diet recommendations.    RECOMMENDATIONS   Diet Recommendations - Solids: Regular  Diet Recommendations - Liquids: Thin Liquids       Compensatory Strategies Recommended: Slow rate;Alternate consistencies;Small bites and sips  Aspiration Precautions: Upright position;Slow rate;Small bites and sips  Medication Administration  Recommendations: One pill at a time (whole or crushed in puree if any difficulties)  Treatment Plan/Recommendations: Dysphagia therapy;Aspiration precautions    HISTORY   MEDICAL HISTORY  Reason for Referral: RN dysphagia screen    Problem List  Principal Problem:    Hypothermia, initial encounter  Active Problems:    Fall, initial encounter    Alzheimer's dementia, unspecified dementia severity, unspecified timing of dementia onset, unspecified whether behavioral, psychotic, or mood disturbance or anxiety (HCC)    Bandemia    Acute cystitis without hematuria    Dehydration      Past Medical History  Past Medical History:    Arrhythmia    Arthritis    Dementia (HCC)    Glenohumeral arthritis, left    Heart palpitations    HTN (hypertension)    Impingement syndrome of shoulder, left    Left bicipital tenosynovitis    Osteoporosis    Pituitary adenoma (HCC)    surgery 1982.     Primary osteoarthritis of both knees    PVD (posterior vitreous detachment), both eyes    Screening for malignant neoplasm of colon    Weight loss       Prior Living Situation: Home with spouse (Home with spouse who also Alzheimer's dementia)  Diet Prior to Admission: Regular;Thin liquids  Precautions: None    Patient/Family Goals: Unknown    SWALLOWING HISTORY  Current Diet Consistency: NPO  Dysphagia History: No documented history of dysphagia.   Imaging Results: CT 11/4/2024:  CONCLUSION:    1. No acute process.   2. Stable atrophy and white matter disease.  CXR 11/4/2024:   CONCLUSION:  Exam is within normal limits.     SUBJECTIVE       OBJECTIVE   ORAL MOTOR EXAMINATION  Dentition: Natural;Functional  Symmetry: Within Functional Limits  Strength: Within Functional Limits  Tone: Within Functional Limits  Range of Motion: Within Functional Limits  Rate of Motion: Within Functional Limits    Voice Quality: Clear  Respiratory Status: Unlabored  Consistencies Trialed: Thin liquids;Puree;Hard solid  Method of Presentation: Self  presentation;Cup;Straw;Single sips;Consecutive swallows  Patient Positioning: Upright;Midline    Oral Phase of Swallow: Within Functional Limits     Pharyngeal Phase of Swallow:  (appears to be WFL- no overt s/s of aspiration observed)      (Please note: Silent aspiration cannot be evaluated clinically. Videofluoroscopic Swallow Study is required to rule-out silent aspiration.)    Esophageal Phase of Swallow: No complaints consistent with possible esophageal involvement    GOALS  Goal #1 The patient will tolerate regular consistency and thin liquids without overt signs or symptoms of aspiration with 95 % accuracy over 1-2 session(s).  New goal   Goal #2 The patient/family/caregiver will demonstrate understanding and implementation of aspiration precautions and swallow strategies independently over 1-2 session(s).    New goal   Goal #3 The patient will utilize compensatory strategies as outlined by  BSSE (clinical evaluation) including Slow rate, Small bites, Small sips, Upright 90 degrees with minimal assistance 95 % of the time across 2 sessions.  New goal     FOLLOW UP  Treatment Plan/Recommendations: Dysphagia therapy;Aspiration precautions  Number of Visits to Meet Established Goals:  (1-2)  Follow Up Needed (Documentation Required): Yes  SLP Follow-up Date: 11/06/24    Thank you for your referral.   If you have any questions, please contact Aliza GALAN SLP Student Intern

## 2024-11-05 NOTE — ED QUICK NOTES
Orders for admission, patient is aware of plan and ready to go upstairs. Any questions, please call ED RN Morena at extension [phone].     Patient Covid vaccination status: Fully vaccinated     COVID Test Ordered in ED: None    COVID Suspicion at Admission: N/A    Running Infusions:      Mental Status/LOC at time of transport: A&O x1    Other pertinent information:   CIWA score: N/A   NIH score:  N/A

## 2024-11-05 NOTE — PLAN OF CARE
Assumed care of patient at 0335.  Pt arrived to unit alone via wheelchair. Very unsteady on feet, therefore was not able to get a standing orthostatic BP. Will be considered a x2 assist until assessed by PT/OT.  Alert. Oriented x1, knows name, not birthday. Able to follow commands.  O2 sat 99% on room air. /75, PRN Hydralazine given. MD notified of BP.  Admission questions completed as much as possible with the patient's answers.  Small area of non-red discoloration on coccyx, skin otherwise C/D/I. Skin assessment done with Idris ANTHONY.  Potassium 3.7, replaced.   RN dysphagia screen performed, patient placed on NPO due to gurgling when drinking water. Will be assessed by speech therapy later today.  Discussed POC, pt verbalized understanding.  Bed in lowest position with alarm on. Pt performed return demonstration of call light use.       Problem: Patient/Family Goals  Goal: Patient/Family Long Term Goal  Description: Patient's Long Term Goal: to be discharged    Interventions:  - See additional Care Plan goals for specific interventions  Outcome: Progressing  Goal: Patient/Family Short Term Goal  Description: Patient's Short Term Goal: to be discharged    Interventions:   - See additional Care Plan goals for specific interventions  Outcome: Progressing

## 2024-11-06 LAB
ANION GAP SERPL CALC-SCNC: 2 MMOL/L (ref 0–18)
BASOPHILS # BLD AUTO: 0.03 X10(3) UL (ref 0–0.2)
BASOPHILS NFR BLD AUTO: 0.3 %
BUN BLD-MCNC: 12 MG/DL (ref 9–23)
CALCIUM BLD-MCNC: 9.2 MG/DL (ref 8.7–10.4)
CHLORIDE SERPL-SCNC: 107 MMOL/L (ref 98–112)
CO2 SERPL-SCNC: 31 MMOL/L (ref 21–32)
CREAT BLD-MCNC: 0.81 MG/DL
EGFRCR SERPLBLD CKD-EPI 2021: 76 ML/MIN/1.73M2 (ref 60–?)
EOSINOPHIL # BLD AUTO: 0.04 X10(3) UL (ref 0–0.7)
EOSINOPHIL NFR BLD AUTO: 0.4 %
ERYTHROCYTE [DISTWIDTH] IN BLOOD BY AUTOMATED COUNT: 13.7 %
GLUCOSE BLD-MCNC: 87 MG/DL (ref 70–99)
HCT VFR BLD AUTO: 34.6 %
HGB BLD-MCNC: 11.3 G/DL
IMM GRANULOCYTES # BLD AUTO: 0.03 X10(3) UL (ref 0–1)
IMM GRANULOCYTES NFR BLD: 0.3 %
LACTATE SERPL-SCNC: 1 MMOL/L (ref 0.5–2)
LYMPHOCYTES # BLD AUTO: 1.45 X10(3) UL (ref 1–4)
LYMPHOCYTES NFR BLD AUTO: 13.6 %
MCH RBC QN AUTO: 29.6 PG (ref 26–34)
MCHC RBC AUTO-ENTMCNC: 32.7 G/DL (ref 31–37)
MCV RBC AUTO: 90.6 FL
MONOCYTES # BLD AUTO: 0.69 X10(3) UL (ref 0.1–1)
MONOCYTES NFR BLD AUTO: 6.5 %
NEUTROPHILS # BLD AUTO: 8.44 X10 (3) UL (ref 1.5–7.7)
NEUTROPHILS # BLD AUTO: 8.44 X10(3) UL (ref 1.5–7.7)
NEUTROPHILS NFR BLD AUTO: 78.9 %
OSMOLALITY SERPL CALC.SUM OF ELEC: 289 MOSM/KG (ref 275–295)
PLATELET # BLD AUTO: 230 10(3)UL (ref 150–450)
POTASSIUM SERPL-SCNC: 3.9 MMOL/L (ref 3.5–5.1)
POTASSIUM SERPL-SCNC: 3.9 MMOL/L (ref 3.5–5.1)
RBC # BLD AUTO: 3.82 X10(6)UL
SODIUM SERPL-SCNC: 140 MMOL/L (ref 136–145)
WBC # BLD AUTO: 10.7 X10(3) UL (ref 4–11)

## 2024-11-06 PROCEDURE — 92526 ORAL FUNCTION THERAPY: CPT

## 2024-11-06 PROCEDURE — 97116 GAIT TRAINING THERAPY: CPT

## 2024-11-06 PROCEDURE — 85025 COMPLETE CBC W/AUTO DIFF WBC: CPT | Performed by: INTERNAL MEDICINE

## 2024-11-06 PROCEDURE — 80048 BASIC METABOLIC PNL TOTAL CA: CPT | Performed by: INTERNAL MEDICINE

## 2024-11-06 PROCEDURE — 84132 ASSAY OF SERUM POTASSIUM: CPT | Performed by: INTERNAL MEDICINE

## 2024-11-06 PROCEDURE — 83605 ASSAY OF LACTIC ACID: CPT | Performed by: INTERNAL MEDICINE

## 2024-11-06 NOTE — PLAN OF CARE
Assumed patient care approximately 1930. Patient is alert and oriented to self, but not time, situation , nor place. SpO2 maintained on room air with saturation maintaining greater than 89%.. NSR, SB  on tele. Brief and pure wick in place. No pain reported. Ambulates with one and a walker. Education provided on plan and care, patient  verbalize understanding.      Plan of care: Re-orienting, PT/OT, Busy apron,       Problem: Patient/Family Goals  Goal: Patient/Family Long Term Goal  Description: Patient's Long Term Goal: Be healthy    Interventions:  - Reorient   - See additional Care Plan goals for specific interventions  Outcome: Progressing  Goal: Patient/Family Short Term Goal  Description: Patient's Short Term Goal: Go home    Interventions:   - See additional Care Plan goals for specific interventions  Outcome: Progressing     Problem: CARDIOVASCULAR - ADULT  Goal: Maintains optimal cardiac output and hemodynamic stability  Description: INTERVENTIONS:  - Monitor vital signs, rhythm, and trends  - Monitor for bleeding, hypotension and signs of decreased cardiac output  - Evaluate effectiveness of vasoactive medications to optimize hemodynamic stability  - Monitor arterial and/or venous puncture sites for bleeding and/or hematoma  - Assess quality of pulses, skin color and temperature  - Assess for signs of decreased coronary artery perfusion - ex. Angina  - Evaluate fluid balance, assess for edema, trend weights  Outcome: Progressing  Goal: Absence of cardiac arrhythmias or at baseline  Description: INTERVENTIONS:  - Continuous cardiac monitoring, monitor vital signs, obtain 12 lead EKG if indicated  - Evaluate effectiveness of antiarrhythmic and heart rate control medications as ordered  - Initiate emergency measures for life threatening arrhythmias  - Monitor electrolytes and administer replacement therapy as ordered  Outcome: Progressing

## 2024-11-06 NOTE — PLAN OF CARE
Assumed care of p/t at shift change. Alert and oriented x0-1. Able to remember first name, but not last name. Alert and responds well to commands. Maintains O2 sat on RA. No complaints of pain or shortness of breath. Lung sounds clear. SB on tele monitor. Incontinent of bowel and bladder. Using purewick. Up x1 with a walker. Personal possessions and call light within reach. Updated on plan of care.      Problem: Patient/Family Goals  Goal: Patient/Family Long Term Goal  Description: Patient's Long Term Goal: Stay out of the hospital    Interventions:  - Take medications as prescribed.  - Attend all follow up appointments.  - See additional Care Plan goals for specific interventions  Outcome: Progressing  Goal: Patient/Family Short Term Goal  Description: Patient's Short Term Goal: To discharge home    Interventions:   - IV abx  - PT/OT  - See additional Care Plan goals for specific interventions  Outcome: Progressing     Problem: CARDIOVASCULAR - ADULT  Goal: Maintains optimal cardiac output and hemodynamic stability  Description: INTERVENTIONS:  - Monitor vital signs, rhythm, and trends  - Monitor for bleeding, hypotension and signs of decreased cardiac output  - Evaluate effectiveness of vasoactive medications to optimize hemodynamic stability  - Monitor arterial and/or venous puncture sites for bleeding and/or hematoma  - Assess quality of pulses, skin color and temperature  - Assess for signs of decreased coronary artery perfusion - ex. Angina  - Evaluate fluid balance, assess for edema, trend weights  Outcome: Progressing  Goal: Absence of cardiac arrhythmias or at baseline  Description: INTERVENTIONS:  - Continuous cardiac monitoring, monitor vital signs, obtain 12 lead EKG if indicated  - Evaluate effectiveness of antiarrhythmic and heart rate control medications as ordered  - Initiate emergency measures for life threatening arrhythmias  - Monitor electrolytes and administer replacement therapy as  ordered  Outcome: Progressing

## 2024-11-06 NOTE — PROGRESS NOTES
[facility]  Hospitalist Progress Note                                                                     [facility]   part of [facility]        Kierra Blanco  4/29/1950    SUBJECTIVE:  Patient seen and examined.  Feeling better.  Family bedside.  Denies CP/SOB.  NAD.       OBJECTIVE:  Temp:  [97.8 °F (36.6 °C)-98.7 °F (37.1 °C)] 98.1 °F (36.7 °C)  Pulse:  [51-62] 57  Resp:  [17-19] 18  BP: (132-160)/(67-79) 149/79  SpO2:  [96 %-99 %] 96 %  Exam  Gen: No acute distress, alert and oriented x1-2, no focal neurologic deficits  Pulm: Lungs clear bilaterally, normal respiratory effort  CV: Heart with regular rate and rhythm, no murmur.  Normal PMI.    Abd: Abdomen soft, nontender, nondistended, no organomegaly, bowel sounds present  MSK: Full range of motion in extremities, no clubbing, no cyanosis  Skin: no rashes or lesions    Labs:   Recent Labs   Lab 11/04/24 2149 11/06/24  0511   WBC 14.1* 10.7   HGB 12.6 11.3*   MCV 92.0 90.6   .0 230.0       Recent Labs   Lab 11/04/24 2149 11/06/24  0511    140   K 3.7 3.9  3.9    107   CO2 27.0 31.0   BUN 23 12   CREATSERUM 0.92 0.81   CA 10.3 9.2   * 87       Recent Labs   Lab 11/04/24 2149   ALT 17   AST 26   ALB 4.2       No results for input(s): \"PGLU\" in the last 168 hours.    Meds:   Scheduled:    donepezil  10 mg Oral Nightly    cefTRIAXone  1 g Intravenous Q24H    heparin  5,000 Units Subcutaneous Q8H CUCO    flecainide  100 mg Oral BID    memantine  10 mg Oral BID    metoprolol succinate  12.5 mg Oral Daily Beta Blocker     Continuous Infusions:   PRN:   hydrALAZINE    acetaminophen    polyethylene glycol (PEG 3350)    sennosides    bisacodyl    fleet enema    ondansetron    metoclopramide    Assessment/Plan:  Principal Problem:    Hypothermia, initial encounter  Active Problems:    Fall, initial encounter    Alzheimer's dementia, unspecified dementia severity, unspecified  timing of dementia onset, unspecified whether behavioral, psychotic, or mood disturbance or anxiety (HCC)    Bandemia    Acute cystitis without hematuria    Dehydration    74 year old female with PMH sig for dementia, hypertension, history of SVT scented with falls.      Acute UTI  - cont ceftriaxone  - gentle fluids, can dc  - await cultures - GNR     Metabolic Toxic encephalopathy leading to falls/weakness - improving   - from above, monitor  - CTOH reviewed     Essential HTN  - metoprolol     Hx SVT  - flecainide      Dementia  - donepezil, memantine      FEN: regular diet, PT/OT  Proph: SCDs, lovenox    Dispo - dc tomorrow      Javed Parker MD  [facility]  Message over CPM [employer]/RSVP Law/Fillm  Pager: [phone]    Supplementary Documentation:   DVT Mechanical Prophylaxis:   SCDs,    DVT Pharmacologic Prophylaxis   Medication    heparin (Porcine) 5000 UNIT/ML injection 5,000 Units                Code Status: Not on file  Kebede: External urinary catheter in place  Kebede Duration (in days):   Central line:    SAMATNHA:

## 2024-11-06 NOTE — PHYSICAL THERAPY NOTE
PHYSICAL THERAPY TREATMENT NOTE - INPATIENT    Room Number: 8622/8622-A     Session: 1     Number of Visits to Meet Established Goals: 5    Presenting Problem: fall, hypothermia, bradycardia  Co-Morbidities : dementia, PVD, osteoporosis, HTN, MAKENZIE    PHYSICAL THERAPY ASSESSMENT   Patient demonstrates steady progress this session, goals  remain in progress.      Patient is requiring contact guard assist and minimal assist as a result of the following impairments: decreased functional strength and decreased endurance/aerobic capacity.     Patient continues to function near baseline with bed mobility, transfers, and gait.  Next session anticipate patient to progress gait.  Physical Therapy will continue to follow patient for duration of hospitalization.    Patient continues to benefit from continued skilled PT services: at discharge to promote prior level of function and safety with additional support and return home with home health PT.    PLAN DURING HOSPITALIZATION  Nursing Mobility Recommendation : 1 Assist  PT Device Recommendation: Rolling walker;Gait belt  PT Treatment Plan: Bed mobility;Endurance;Energy conservation;Patient education;Family education;Gait training;Strengthening;Transfer training;Balance training  Frequency (Obs): 3-5x/week     CURRENT GOALS     Goal #1 Patient is able to demonstrate supine - sit EOB @ level: supervision      Goal #2 Patient is able to demonstrate transfers Sit to/from Stand at assistance level: supervision      Goal #3 Patient is able to ambulate 200 feet with assist device:  LRAD  at assistance level: supervision      Goal #4 Consider stair goal as appropriate   Goal #5     Goal #6     Goal Comments: Goals established on 2024 all goals ongoing    SUBJECTIVE  \"I am fine, my sister may visit?    OBJECTIVE  Precautions: Bed/chair alarm    WEIGHT BEARING RESTRICTION     PAIN ASSESSMENT   Ratin          BALANCE                                                                                                                        Static Sitting: Good  Dynamic Sitting: Good           Static Standing: Poor +  Dynamic Standing: Poor +    ACTIVITY TOLERANCE                         O2 WALK       AM-PAC '6-Clicks' INPATIENT SHORT FORM - BASIC MOBILITY  How much difficulty does the patient currently have...  Patient Difficulty: Turning over in bed (including adjusting bedclothes, sheets and blankets)?: None   Patient Difficulty: Sitting down on and standing up from a chair with arms (e.g., wheelchair, bedside commode, etc.): A Little   Patient Difficulty: Moving from lying on back to sitting on the side of the bed?: None   How much help from another person does the patient currently need...   Help from Another: Moving to and from a bed to a chair (including a wheelchair)?: A Little   Help from Another: Need to walk in hospital room?: A Little   Help from Another: Climbing 3-5 steps with a railing?: A Little     AM-PAC Score:  Raw Score: 20   Approx Degree of Impairment: 35.83%   Standardized Score (AM-PAC Scale): 47.67   CMS Modifier (G-Code): CJ    FUNCTIONAL ABILITY STATUS  Gait Assessment   Functional Mobility/Gait Assessment  Gait Assistance: Minimum assistance  Distance (ft): 300  Assistive Device: None (encouraged walker, but poiletly refused)  Pattern:  (pt significantly unsteady and requires assist for RW management)    Skilled Therapy Provided    Bed Mobility:  Rolling: NT   Supine<>Sit: SBA   Sit<>Supine: NT     Transfer Mobility:  Sit<>Stand: CGA   Stand<>Sit: CGA   Gait: Min A for occasional loss of balance - recommended walker    Therapist's Comments: Pt's sister updated on session findings - see SW work for social support for Pt and Pt's spouse.  Pt remains impulsive and with poor safety awareness.      Patient End of Session: Up in chair;Needs met;Call light within reach;RN aware of session/findings;All patient questions and concerns addressed;Hospital anti-slip  socks;Alarm set;Family present    PT Session Time: 15 minutes  Gait Training: 15 minutes  Therapeutic Activity: 0 minutes  Therapeutic Exercise: 0 minutes   Neuromuscular Re-education: 0 minutes

## 2024-11-06 NOTE — CM/SW NOTE
Received call from Latoya  [phone] at [facility] stating that they are able to provide 24 hour caregiver support to pt/spouse at discharge. They have spoken with the pt's sister to arrange. Will also speak with Georgiana to confirm.     Will have to fax AVS to [facility] at discharge: [phone]    Marizol MICHEL, LSW  Discharge Planner

## 2024-11-06 NOTE — SLP NOTE
SPEECH DAILY NOTE - INPATIENT    ASSESSMENT & PLAN   ASSESSMENT  Pt seen for dysphagia tx to assess tolerance with recommended diet, ensure proper utilization of aspiration precautions and provide pt/family education.  Patient alert and sitting upright in bedside chair eating lunch at time of my visit. Patient reported good tolerance of PO intake. No overt s/s of aspiration observed with items from patient's lunch. Patient denied odynophagia and globus sensation. Recommend patient continue a regular diet and thin liquids with safe swallowing precautions listed below. No further SLP services warranted at this time. Education provided re: recommendations.    Diet Recommendations - Solids: Regular  Diet Recommendations - Liquids: Thin Liquids    Compensatory Strategies Recommended: Slow rate;Alternate consistencies;Small bites and sips  Aspiration Precautions: Upright position;Slow rate;Small bites and sips  Medication Administration Recommendations: One pill at a time (whole or crushed in puree if any difficulties)    Patient Experiencing Pain: No                Treatment Plan  Treatment Plan/Recommendations: No further inpatient SLP service warranted    Interdisciplinary Communication: NA            GOALS  Goal #1 The patient will tolerate regular consistency and thin liquids without overt signs or symptoms of aspiration with 95 % accuracy over 1-2 session(s).  Met   Goal #2 The patient/family/caregiver will demonstrate understanding and implementation of aspiration precautions and swallow strategies independently over 1-2 session(s).     Met   Goal #3 The patient will utilize compensatory strategies as outlined by  BSSE (clinical evaluation) including Slow rate, Small bites, Small sips, Upright 90 degrees with minimal assistance 95 % of the time across 2 sessions.  Met        FOLLOW UP  Follow Up Needed (Documentation Required): No  SLP Follow-up Date: 11/06/24  Number of Visits to Meet Established Goals:   (1-2)    Session: 2    If you have any questions, please contact NELIDA Zafar

## 2024-11-06 NOTE — CM/SW NOTE
SW received call from sister inquiring if pt could be evaluated by [facility] prior to discharge. Explained that it is anticipated that pt will be discharged on Thursday and it is best for pt to be evaluated by the facility in pt's home environment. Sister was understanding.     Marizol MICHEL, LSW  Discharge Planner

## 2024-11-06 NOTE — PAYOR COMM NOTE
--------------  ADMISSION REVIEW     Payor: [employer] ADV PPO  Subscriber #:  [id number]  Authorization Number: [id number]    Admit date: 11/5/24  Admit time:  3:21 AM       REVIEW DOCUMENTATION:     ED Provider Notes        ED Provider Notes signed by Todd Carter MD at 11/5/2024  3:54 AM       Author: Todd Carter MD Service: -- Author Type: Physician    Filed: 11/5/2024  3:54 AM Date of Service: 11/4/2024 10:05 PM Status: Signed    : Todd Carter MD (Physician)           Patient Seen in: [facility] Emergency Department      History     Chief Complaint   Patient presents with    Dehydration    Fall     Stated Complaint: multiple falls    Subjective:   HPI      74-year-old female who was brought by ambulance and apparently patient and  of Alzheimer's.  Patient's family was watching them on the ring camera and the patient fell.  Patient arrives covered in stool and appears very disheveled she is ANO x 0 and just response to painful stimuli.  She was also found to be hypothermic and bradycardic in need of immediate intervention.    Objective:     No pertinent past medical history.            No pertinent past surgical history.              No pertinent social history.                Physical Exam     ED Triage Vitals   BP 11/04/24 2123 (!) 177/74   Pulse 11/04/24 2121 51   Resp 11/04/24 2121 20   Temp 11/04/24 2135 (!) 95.8 °F (35.4 °C)   Temp src 11/04/24 2135 Rectal   SpO2 11/04/24 2121 96 %   O2 Device 11/04/24 2121 None (Room air)       Current Vitals:   Vital Signs  BP: (!) 188/75  Pulse: 61  Resp: 16  Temp: 97.7 °F (36.5 °C)  Temp src: Oral  MAP (mmHg): (!) 109    Oxygen Therapy  SpO2: 99 %  O2 Device: None (Room air)  Pulse Oximetry Type: Continuous        Physical Exam    Vital signs reviewed  General appearance: Patient is arousable to painful stimuli disheveled covered in feces  HEENT: Pupils equal react to light extraocular muscles intact no scleral icterus, mucous membranes are dry  there is no erythema or exudate in the posterior pharynx  Neck: Supple no JVD no lymphadenopathy no meningismus no carotid bruit  CV: Bradycardic, no murmur no rub  Respiratory: Clear to auscultation bilaterally no crackles no wheezes no accessory muscle use  Abdomen: Soft nontender nondistended, no rebound no guarding  no hepatosplenomegaly bowel sounds are present , no pulsatile mass  Extremities: No clubbing cyanosis or edema 2+ distal pulses.  Neuro: Patient response to painful stimuli but not answering any questions      ED Course     Labs Reviewed   COMP METABOLIC PANEL (14) - Abnormal; Notable for the following components:       Result Value    Glucose 111 (*)     All other components within normal limits   CBC WITH DIFFERENTIAL WITH PLATELET - Abnormal; Notable for the following components:    WBC 14.1 (*)     Neutrophil Absolute Prelim 11.59 (*)     Neutrophil Absolute 11.59 (*)     All other components within normal limits   URINALYSIS WITH CULTURE REFLEX - Abnormal; Notable for the following components:    Protein Urine Trace (*)     Nitrite Urine 2+ (*)     Leukocyte Esterase Urine 250 (*)     WBC Urine 21-50 (*)     Bacteria Urine Rare (*)     Squamous Epi. Cells Few (*)     All other components within normal limits   LACTIC ACID, PLASMA - Normal   TSH W REFLEX TO FREE T4 - Normal   RAINBOW DRAW LAVENDER   RAINBOW DRAW LIGHT GREEN   RAINBOW DRAW BLUE   BLOOD CULTURE   BLOOD CULTURE   URINE CULTURE, ROUTINE     EKG    Rate, intervals and axes as noted on EKG Report.  Rate: 49  Rhythm: Sinus Rhythm  Reading: Sinus bradycardia         XR CHEST AP PORTABLE  (CPT=71045)    Result Date: 11/4/2024  CONCLUSION:  Exam is within normal limits.   LOCATION:  [facility]      Dictated by (CST): Eduardo Ortez DO on 11/04/2024 at 11:54 PM     Finalized by (CST): Eduardo Ortez DO on 11/04/2024 at 11:55 PM       CT BRAIN OR HEAD (CPT=70450)    Result Date: 11/4/2024  CONCLUSION:  1. No acute process. 2. Stable atrophy and  white matter disease.    LOCATION:  [facility]   Dictated by (CST): Eduardo Ortez DO on 11/04/2024 at 11:30 PM     Finalized by (CST): Eduardo Ortez DO on 11/04/2024 at 11:31 PM             Patient was evaluated the emergency department had a CBC chemistry urinalysis lactic acid and a thyroid along with blood cultures.  Will be given some warmed IV saline put on I warming blanket and continue rectal temps.    Patient's temperature improved.  Urinalysis showed 2+ nitrates leukoesterase and a white count of 14,000.  She was given a dose of IV antibiotics.  Was given IV fluids.  Lactic acid was normal.  She did warm up after the blanket and became much more alert and orientated.  Do feel patient should be admitted for IV antibiotics and further monitoring as she came covered in feces and was completely out of it however upon my reexamination she was much more alert and looked much better discussed case with the hospitalist who agrees with plan  MDM      Differential diagnosis reflecting the complexity of care include: Sepsis, urinary tract infection, dementia, failure to thrive    Comorbidities that add complexity to management include: Dementia      Discussions of management was done with: Hospitalist was contacted and agrees with plan    My independent interpretation of studies of: Chest x-ray unremarkable exam within normal limits.  CT brain no acute process stable atrophy and white matter disease      Social determinants of health that affect care: Patient lives with her demented  and do not feel living situation is good at this point for her    Shared decision making was done by myself and patient's daughter.  Patient will be admitted she was doing much better after IV fluids and antibiotics.    Patient was evaluated in the emergency department and at this point patient will need admission for further management of medical condition.  Patient was stable in the emergency department and will be transferred to  floor for further definitive care.  Patient's questions were answered.    This note was prepared using Dragon Medical voice recognition dictation software. As a result errors may occur. When identified these errors have been corrected. While every attempt is made to correct errors during dictation discrepancies may still exist        Admission disposition: 11/5/2024  1:57 AM           Medical Decision Making      Disposition and Plan     Clinical Impression:  1. Hypothermia, initial encounter    2. Fall, initial encounter    3. Alzheimer's dementia, unspecified dementia severity, unspecified timing of dementia onset, unspecified whether behavioral, psychotic, or mood disturbance or anxiety (HCC)    4. Bandemia    5. Acute cystitis without hematuria    6. Dehydration         Disposition:  Admit  11/5/2024  1:57 am    Follow-up:  No follow-up provider specified.        Medications Prescribed:  Current Discharge Medication List              Supplementary Documentation:         Hospital Problems       Present on Admission  Date Reviewed: 9/29/2023            ICD-10-CM Noted POA    * (Principal) Hypothermia, initial encounter T68.XXXA 11/4/2024 Unknown    Acute cystitis without hematuria N30.00 11/5/2024 Unknown    Alzheimer's dementia, unspecified dementia severity, unspecified timing of dementia onset, unspecified whether behavioral, psychotic, or mood disturbance or anxiety (HCC) G30.9, F02.80 11/5/2024 Unknown    Bandemia D72.825 11/5/2024 Unknown    Dehydration E86.0 11/5/2024 Unknown    Fall, initial encounter W19.XXXA 11/5/2024 Unknown                                                        Signed by Todd Carter MD on 11/5/2024  3:54 AM         MEDICATIONS ADMINISTERED IN LAST 1 DAY:  cefTRIAXone (Rocephin) 1 g in sodium chloride 0.9% 100 mL IVPB-ADDV       Date Action Dose Route User    11/5/2024 2110 New Bag 1 g Intravenous Mario Powers, RN          donepezil (Aricept) tab 10 mg       Date Action Dose Route  User    11/5/2024 2104 Given 10 mg Oral Mario Powers RN          flecainide (Tambocor) tab 100 mg       Date Action Dose Route User    11/6/2024 0842 Given 100 mg Oral Juan Diego Mckeon RN    11/5/2024 2104 Given 100 mg Oral Mario Powers RN          heparin (Porcine) 5000 UNIT/ML injection 5,000 Units       Date Action Dose Route User    11/6/2024 0533 Given 5,000 Units Subcutaneous (Left Lower Abdomen) Mario Powers RN    11/5/2024 2105 Given 5,000 Units Subcutaneous (Right Lower Abdomen) Mario Powers RN    11/5/2024 1430 Given 5,000 Units Subcutaneous (Left Lower Abdomen) Juan Diego Mckeon RN          memantine (Namenda) tab 10 mg       Date Action Dose Route User    11/6/2024 0842 Given 10 mg Oral Juan Diego Mckeon RN    11/5/2024 2104 Given 10 mg Oral Mario Powers RN          metoprolol succinate (Toprol XL) partial tablet 12.5 mg       Date Action Dose Route User    11/6/2024 0532 Given 12.5 mg Oral Mario Powers RN            Vitals (last day)       Date/Time Temp Pulse Resp BP SpO2 Weight O2 Device O2 Flow Rate (L/min) [facility]    11/06/24 1021 -- 57 -- -- 96 % -- -- -- MB    11/06/24 0754 98.1 °F (36.7 °C) 55 18 149/79 99 % -- None (Room air) -- MB    11/06/24 0500 -- 60 -- 145/73 -- -- -- -- RL    11/06/24 0447 98.5 °F (36.9 °C) 57 18 145/73 98 % -- None (Room air) 0 L/min AO    11/05/24 2336 98.7 °F (37.1 °C) 51 18 153/67 98 % -- None (Room air) 0 L/min AO    11/05/24 1924 98.2 °F (36.8 °C) 61 18 140/69 97 % -- None (Room air) 0 L/min AO    11/05/24 1747 -- 62 -- -- 98 % -- -- -- PT    11/05/24 1606 98.5 °F (36.9 °C) 60 19 160/75 97 % -- None (Room air) 0 L/min PT    11/05/24 1148 97.8 °F (36.6 °C) 58 17 132/70 99 % -- None (Room air) 0 L/min PT    11/05/24 1144 -- 57 -- 132/70 -- -- -- -- VZ    11/05/24 1001 -- 55 -- -- 99 % -- -- -- PT    11/05/24 0755 97.9 °F (36.6 °C) 61 17 132/62 98 % -- None (Room air) 0 L/min PT    11/05/24 0412 -- -- -- -- -- 126 lb (57.2 kg) -- -- NS    11/05/24 0339 -- 61 -- -- 99  % -- -- -- PM    11/05/24 0334 97.7 °F (36.5 °C) 58 16 188/75 99 % -- None (Room air) -- PM    11/05/24 0300 -- 62 17 141/62 97 % -- None (Room air) -- EC    11/05/24 0215 -- 59 18 151/65 97 % -- None (Room air) -- EC    11/05/24 0130 -- 55 16 160/62 96 % -- None (Room air) -- EC       11/5 H&P        Chief Complaint   Patient presents with    Dehydration    Fall        History of Present Illness: Patient is a 74 year old female with PMH sig for dementia, hypertension, history of SVT scented with falls.  She lives with her  and they both have Alzheimer's.  Apparently family watches them through a ring camera and they saw her falling.  Was brought to the ER and it seems like she was covered in stool and was very disheveled.  She was not alert and was only responding to painful stimuli.  She was found to be hypothermic and bradycardic.  Pressure was 95.8, heart rate was in the 40s.  With leukocytosis and dirty urinalysis.  X-ray and CT of the brain unremarkable.  Patient on ceftriaxone and admitted for further evaluation and treatment.       OBJECTIVE:  /62 (BP Location: Left arm)   Pulse 61   Temp 97.9 °F (36.6 °C) (Oral)   Resp 17   Ht 5' 4\" (1.626 m)   Wt 126 lb (57.2 kg)   SpO2 98%   BMI 21.63 kg/m²          Assessment/Plan:      74 year old female with PMH sig for dementia, hypertension, history of SVT scented with falls.      Acute UTI  - cont ceftriaxone  - gentle fluids  - await cultures     Metabolic Toxic encephalopathy leading to falls/weakness  - from above, monitor  - CTOH reviewed     Essential HTN  - metoprolol     Hx SVT  - flecainide      Dementia  - donepezil, memantine      FEN: regular diet, PT/OT  Proph: SCDs, lovenox     Dispo - SW to arrange for [facility]     11/6 HOSPITALIST NOTE    OBJECTIVE:  Temp:  [97.8 °F (36.6 °C)-98.7 °F (37.1 °C)] 98.1 °F (36.7 °C)  Pulse:  [51-62] 57  Resp:  [17-19] 18  BP: (132-160)/(67-79) 149/79  SpO2:  [96 %-99 %] 96 %  Exam  Gen: No acute distress,  alert and oriented x1-2, no focal neurologic deficits  Pulm: Lungs clear bilaterally, normal respiratory effort  CV: Heart with regular rate and rhythm, no murmur.  Normal PMI.    Abd: Abdomen soft, nontender, nondistended, no organomegaly, bowel sounds present  MSK: Full range of motion in extremities, no clubbing, no cyanosis  Skin: no rashes or lesions     Labs:        Recent Labs   Lab 11/04/24 2149 11/06/24  0511   WBC 14.1* 10.7   HGB 12.6 11.3*   MCV 92.0 90.6   .0 230.0              Recent Labs   Lab 11/04/24 2149 11/06/24  0511    140   K 3.7 3.9  3.9    107   CO2 27.0 31.0   BUN 23 12   CREATSERUM 0.92 0.81   CA 10.3 9.2   * 87       Assessment/Plan:  Principal Problem:    Hypothermia, initial encounter  Active Problems:    Fall, initial encounter    Alzheimer's dementia, unspecified dementia severity, unspecified timing of dementia onset, unspecified whether behavioral, psychotic, or mood disturbance or anxiety (HCC)    Bandemia    Acute cystitis without hematuria    Dehydration     74 year old female with PMH sig for dementia, hypertension, history of SVT scented with falls.      Acute UTI  - cont ceftriaxone  - gentle fluids, can dc  - await cultures - GNR     Metabolic Toxic encephalopathy leading to falls/weakness - improving   - from above, monitor  - CTOH reviewed     Essential HTN  - metoprolol     Hx SVT  - flecainide      Dementia  - donepezil, memantine      FEN: regular diet, PT/OT  Proph: SCDs, lovenox

## 2024-11-07 LAB — GLUCOSE BLD-MCNC: 86 MG/DL (ref 70–99)

## 2024-11-07 PROCEDURE — 82962 GLUCOSE BLOOD TEST: CPT

## 2024-11-07 NOTE — CM/SW NOTE
Anticipate pt is going to be ready for discharge today. DARRIN sent HH Referrals in [place] for PT at home. Orders entered. Will discuss with pt's sister on HHC. DARRIN to contact [facility] once discharge order entered to arrange for the 24 hour caregiver.     Marizol MICHEL, LSW  Discharge Planner

## 2024-11-07 NOTE — PLAN OF CARE
PT A&0-1 (only know name)   Patient is on RA  SB on tele.   Denied CP. Denied SOB.  X1 walker  Incontinent of bowel and bladder, on purewick  Continue monitor with camera over night. Call light within reach, bed alarm on for pt safety.     POC:   IV ABX    Problem: Patient/Family Goals  Goal: Patient/Family Long Term Goal  Description: Patient's Long Term Goal: Stay out of the hospital    Interventions:  - Take medications as prescribed.  - Attend all follow up appointments.  - See additional Care Plan goals for specific interventions  Outcome: Progressing  Goal: Patient/Family Short Term Goal  Description: Patient's Short Term Goal: To discharge home    Interventions:   - IV abx  - PT/OT  - See additional Care Plan goals for specific interventions  Outcome: Progressing     Problem: CARDIOVASCULAR - ADULT  Goal: Maintains optimal cardiac output and hemodynamic stability  Description: INTERVENTIONS:  - Monitor vital signs, rhythm, and trends  - Monitor for bleeding, hypotension and signs of decreased cardiac output  - Evaluate effectiveness of vasoactive medications to optimize hemodynamic stability  - Monitor arterial and/or venous puncture sites for bleeding and/or hematoma  - Assess quality of pulses, skin color and temperature  - Assess for signs of decreased coronary artery perfusion - ex. Angina  - Evaluate fluid balance, assess for edema, trend weights  Outcome: Progressing  Goal: Absence of cardiac arrhythmias or at baseline  Description: INTERVENTIONS:  - Continuous cardiac monitoring, monitor vital signs, obtain 12 lead EKG if indicated  - Evaluate effectiveness of antiarrhythmic and heart rate control medications as ordered  - Initiate emergency measures for life threatening arrhythmias  - Monitor electrolytes and administer replacement therapy as ordered  Outcome: Progressing

## 2024-11-07 NOTE — CM/SW NOTE
Spoke with Georgiana over the phone. She stated that she has arranged for [facility] caregiving for 12 hours 7 days per week for both pt and spouse. Family is working with [facility] and possible [facility] move in into their community.  At discharge, pt's sister will be able to transport and get pt settled at home. [facility] is able to accept pt at discharge - spoke with Georgiana to confirm HH, choice is [facility]. Reserved. Awaiting discharge clearance.     Marizol MICHEL, LSW  Discharge Planner

## 2024-11-07 NOTE — PLAN OF CARE
Assumed care of p/t at shift change. Alert and oriented x1. Maintains O2 sat on RA. SB on tele monitor. No complaints of pain. Lung sounds clear. Incontinent of bowel and bladder. Using purewick. Up x1 with a walker. Personal possessions and call light within reach. Updated on plan of care.    Problem: Patient/Family Goals  Goal: Patient/Family Long Term Goal  Description: Patient's Long Term Goal: Stay out of the hospital    Interventions:  - Take medications as prescribed.  - Attend all follow up appointments.  - See additional Care Plan goals for specific interventions  Outcome: Progressing  Goal: Patient/Family Short Term Goal  Description: Patient's Short Term Goal: To discharge home    Interventions:   - IV abx  - PT/OT  - See additional Care Plan goals for specific interventions  Outcome: Progressing     Problem: CARDIOVASCULAR - ADULT  Goal: Maintains optimal cardiac output and hemodynamic stability  Description: INTERVENTIONS:  - Monitor vital signs, rhythm, and trends  - Monitor for bleeding, hypotension and signs of decreased cardiac output  - Evaluate effectiveness of vasoactive medications to optimize hemodynamic stability  - Monitor arterial and/or venous puncture sites for bleeding and/or hematoma  - Assess quality of pulses, skin color and temperature  - Assess for signs of decreased coronary artery perfusion - ex. Angina  - Evaluate fluid balance, assess for edema, trend weights  Outcome: Progressing  Goal: Absence of cardiac arrhythmias or at baseline  Description: INTERVENTIONS:  - Continuous cardiac monitoring, monitor vital signs, obtain 12 lead EKG if indicated  - Evaluate effectiveness of antiarrhythmic and heart rate control medications as ordered  - Initiate emergency measures for life threatening arrhythmias  - Monitor electrolytes and administer replacement therapy as ordered  Outcome: Progressing

## 2024-11-07 NOTE — PROGRESS NOTES
[facility]  Hospitalist Progress Note                                                                     [facility]   [facility]        Kierra Blanco  4/29/1950    SUBJECTIVE:  Patient seen and examined.  Feeling better.  Family bedside.  Denies CP/SOB.  NAD.       OBJECTIVE:  Temp:  [97.8 °F (36.6 °C)-98.8 °F (37.1 °C)] 98.2 °F (36.8 °C)  Pulse:  [44-57] 49  Resp:  [18-20] 18  BP: (125-163)/(58-69) 129/59  SpO2:  [97 %-100 %] 100 %  Exam  Gen: No acute distress, alert and oriented x1-2, no focal neurologic deficits  Pulm: Lungs clear bilaterally, normal respiratory effort  CV: Heart with regular rate and rhythm, no murmur.  Normal PMI.    Abd: Abdomen soft, nontender, nondistended, no organomegaly, bowel sounds present  MSK: Full range of motion in extremities, no clubbing, no cyanosis  Skin: no rashes or lesions    Labs:   Recent Labs   Lab 11/04/24 2149 11/06/24  0511   WBC 14.1* 10.7   HGB 12.6 11.3*   MCV 92.0 90.6   .0 230.0       Recent Labs   Lab 11/04/24 2149 11/06/24  0511    140   K 3.7 3.9  3.9    107   CO2 27.0 31.0   BUN 23 12   CREATSERUM 0.92 0.81   CA 10.3 9.2   * 87       Recent Labs   Lab 11/04/24  2149   ALT 17   AST 26   ALB 4.2       Recent Labs   Lab 11/07/24  0503   PGLU 86       Meds:   Scheduled:    donepezil  10 mg Oral Nightly    cefTRIAXone  1 g Intravenous Q24H    heparin  5,000 Units Subcutaneous Q8H [facility]    flecainide  100 mg Oral BID    memantine  10 mg Oral BID    metoprolol succinate  12.5 mg Oral Daily Beta Blocker     Continuous Infusions:   PRN:   hydrALAZINE    acetaminophen    polyethylene glycol (PEG 3350)    sennosides    bisacodyl    fleet enema    ondansetron    metoclopramide    Assessment/Plan:  Principal Problem:    Hypothermia, initial encounter  Active Problems:    Fall, initial encounter    Alzheimer's dementia, unspecified dementia severity, unspecified timing  of dementia onset, unspecified whether behavioral, psychotic, or mood disturbance or anxiety (HCC)    Bandemia    Acute cystitis without hematuria    Dehydration    74 year old female with PMH sig for dementia, hypertension, history of SVT scented with falls.      Acute UTI  - cont ceftriaxone  - gentle fluids, can dc  - await cultures - Ecoli     Metabolic Toxic encephalopathy leading to falls/weakness - improving   - from above, monitor  - CTOH reviewed     Essential HTN  - metoprolol     Hx SVT  - flecainide      Dementia  - donepezil, memantine      FEN: regular diet, PT/OT  Proph: SCDs, lovenox    Dispo - dc tomorrow pending final culture sensitivities       Javed Parker MD  [facility] Hospitalist  Message over [email or web address]  Pager: [phone]    Supplementary Documentation:   DVT Mechanical Prophylaxis:   SCDs,    DVT Pharmacologic Prophylaxis   Medication    heparin (Porcine) 5000 UNIT/ML injection 5,000 Units                Code Status: Not on file  Kebede: External urinary catheter in place  Kebede Duration (in days):   Central line:    SAMANTHA: 11/7/2024

## 2024-11-07 NOTE — HOME CARE LIAISON
Received referral via [facility] for Home Health services.  RICARDO Fontana Spoke w/ patient sister Georgiana  and provided with list of HHC providers from [employer], choice is [facility] .Writerplace call to Georgiana, left detailed message and awaiting a call back. Added contact information to the AVS . Will remain available for all and any home health care needs

## 2024-11-07 NOTE — CM/SW NOTE
SW placed call to sisterGeorgiana to discuss discharge plan as it is anticipated that pt will be discharged today. Voicemail message left and requested call back.     Marizol MICHEL, LSW  Discharge Planner

## 2024-11-08 VITALS
RESPIRATION RATE: 18 BRPM | HEART RATE: 52 BPM | TEMPERATURE: 98 F | OXYGEN SATURATION: 100 % | WEIGHT: 126 LBS | BODY MASS INDEX: 21.51 KG/M2 | SYSTOLIC BLOOD PRESSURE: 147 MMHG | HEIGHT: 64 IN | DIASTOLIC BLOOD PRESSURE: 70 MMHG

## 2024-11-08 RX ORDER — DONEPEZIL HYDROCHLORIDE 10 MG/1
10 TABLET, FILM COATED ORAL NIGHTLY
Qty: 30 TABLET | Refills: 11 | Status: SHIPPED | OUTPATIENT
Start: 2024-11-08 | End: 2025-11-03

## 2024-11-08 RX ORDER — SULFAMETHOXAZOLE AND TRIMETHOPRIM 800; 160 MG/1; MG/1
1 TABLET ORAL 2 TIMES DAILY
Qty: 6 TABLET | Refills: 0 | Status: SHIPPED | OUTPATIENT
Start: 2024-11-08 | End: 2024-11-11

## 2024-11-08 NOTE — DISCHARGE INSTRUCTIONS
[facility]  P:[phone]  F:[phone]    Sometimes managing your health at home requires assistance.  The [facility] team has recognized your preference to use [facility].  They can be reached by phone at [phone].  The fax number for your reference is [phone].  A representative from the home health agency will contact you or your family to schedule your first visit.        Polly is the liaison with [facility] that helps connect families and patients with caregiver support resources and assisted livings. You can contact her at [phone] or [phone]. You can also email Polly at [email or web address]. She is available 7 days per week.     [facility] support at discharge.

## 2024-11-08 NOTE — PLAN OF CARE
Alert, oriented to self. SB in the 50s, occasionally upper 40s. Bps WDL. Tolerating room air. Up in bathroom voiding/occasionally incontinent. Possible discharge today.

## 2024-11-08 NOTE — PROGRESS NOTES
Addended by: LIZZY SWIFT on: 2020 01:01 PM     Modules accepted: Orders     Resting quietly.Denies c/o discomfort.Sister on bedside.DC instruction given-verbalzed understanding.DC tele.DC hl.

## 2024-11-08 NOTE — CM/SW NOTE
11/08/24 1200   Discharge disposition   Expected discharge disposition Home-Health   Post Acute Care Provider [facility]   Home services after discharge Employed caregiver   Discharge transportation Private car       Pt cleared to [facility] today. Spoke with Leana at bedside, she is going to transport pt home. The caregiver from [facility] is at the house. Spoke with Karen to make aware of discharge. Will fax the AVS once available. Leana is requesting that new medications be sent to [facility] for . RN updated.     SW updated [facility] liaison of discharge today.     [facility]  P:[phone]  F:[phone]    Marizol MICHEL, LSW  Discharge Planner

## 2024-11-08 NOTE — DISCHARGE SUMMARY
[facility] Hospitalist Discharge Summary     Patient ID:  Kierra Blanco  74 year old  4/29/1950    Admit date: 11/4/2024    Discharge date and time: 11/08/24     Attending Physician: Marilin Fuentes MD     Primary Care Physician: Steve Agrawal MD     Discharge Diagnoses: Dehydration [E86.0]  Bandemia [D72.825]  Acute cystitis without hematuria [N30.00]  Hypothermia, initial encounter [T68.XXXA]  Fall, initial encounter [W19.XXXA]  Alzheimer's dementia, unspecified dementia severity, unspecified timing of dementia onset, unspecified whether behavioral, psychotic, or mood disturbance or anxiety (HCC) [G30.9, F02.80]    Please note that only IHP DMG and EMG patients enrolled in the Medicare ACO, [facility] ACO and [facility] HMOs will be handled by the [facility] Care Management team.  For all other patients, please follow usual protocol for discharge care transition.    Discharge Condition: stable    Disposition:  home/C    Important Follow up:  - PCP within 2 weeks       Follow-up Information       Steve Agrawal MD. Schedule an appointment as soon as possible for a visit.    Specialty: Internal Medicine  Contact information:  [address] [id number] [phone]                                 Hospital Course:        74 year old female with PMH sig for dementia, hypertension, history of SVT scented with falls.  She lives with her  and they both have Alzheimer's.  Apparently family watches them through a ring camera and they saw her falling.  Was brought to the ER and it seems like she was covered in stool and was very disheveled.  She was not alert and was only responding to painful stimuli.  She was found to be hypothermic and bradycardic.  Pressure was 95.8, heart rate was in the 40s.  With leukocytosis and dirty urinalysis.  X-ray and CT of the brain unremarkable.  Patient on ceftriaxone and admitted for further  evaluation and treatment.     Acute E.coli UTI  - cont ceftriaxone - although resistant to CTX, clinically she improved. Will dc with 3 more days of bactrim   - gentle fluids, can dc  - await cultures - Ecoli, R to amp, cefazol, ctx      Metabolic Toxic encephalopathy leading to falls/weakness - improving   - from above, monitor  - CTOH reviewed     Essential HTN  - metoprolol     Hx SVT  - flecainide      Dementia  - donepezil, memantine       Consults: IP CONSULT TO SOCIAL WORK    Operative Procedures:        Patient instructions:      I as the attending physician reconciled the current and discharge medications on day of discharge.     Current Discharge Medication List        START taking these medications    Details   sulfamethoxazole-trimethoprim -160 MG Oral Tab per tablet Take 1 tablet by mouth 2 (two) times daily for 3 days.           CONTINUE these medications which have CHANGED    Details   donepezil 10 MG Oral Tab Take 1 tablet (10 mg total) by mouth nightly.           CONTINUE these medications which have NOT CHANGED    Details   metoprolol succinate 12.5 mg Oral Tab Take 1 Partial Tablet (12.5 mg total) by mouth Daily Beta Blocker.      memantine 10 MG Oral Tab Take 1 tablet (10 mg total) by mouth 2 (two) times daily.      flecainide 100 MG Oral Tab Take 1 tablet (100 mg total) by mouth 2 (two) times daily.      hydrALAZINE 25 MG Oral Tab Take 1 tablet (25 mg total) by mouth 2 (two) times daily as needed (BP greater than 160/90).      FOLIC ACID OR Take by mouth As Directed.      vitamin B-12 50 MCG Oral Tab Take by mouth As Directed.             Activity: activity as tolerated  Diet: regular diet  Wound Care: as directed  Code Status: No Order      Discharge Exam:     General: no acute distress, alert and oriented x 2, pleasant confusion  Heart: RRR  Lungs: clear bilaterally, no active wheezing  Abdomen: nontender, nondistended, intact BS  Extremities: no pedal edema   Neuro: CN inact, no focal  deficits      Total time coordinating care for discharge: Greater than 30 minutes    Javed Parker MD  [facility]

## 2024-11-13 NOTE — PAYOR COMM NOTE
--------------  DISCHARGE REVIEW    Payor: [employer] ADV PPO  Subscriber #:  [id number]  Authorization Number: [id number]    Admit date: 11/5/24  Admit time:   3:21 AM  Discharge Date: 11/8/2024  2:28 PM                                                          [facility]     Patient ID:  Kierra Blanco  74 year old  4/29/1950    Admit date: 11/4/2024    Discharge date and time: 11/08/24     Attending Physician: Marilin Fuentes MD     Primary Care Physician: Steve Argawal MD     Discharge Diagnoses: Dehydration [E86.0]  Bandemia [D72.825]  Acute cystitis without hematuria [N30.00]  Hypothermia, initial encounter [T68.XXXA]  Fall, initial encounter [W19.XXXA]  Alzheimer's dementia, unspecified dementia severity, unspecified timing of dementia onset, unspecified whether behavioral, psychotic, or mood disturbance or anxiety (HCC) [G30.9, F02.80]    Please note that only IHP DMG and EMG patients enrolled in the Medicare ACO, [facility] ACO and [facility] HMOs will be handled by the [facility] Care Management team.  For all other patients, please follow usual protocol for discharge care transition.    Discharge Condition: stable    Disposition:  home/C    Important Follow up:  - PCP within 2 weeks       Follow-up Information       tSeve Agrawal MD. Schedule an appointment as soon as possible for a visit.    Specialty: Internal Medicine  Contact information:  [address] [place] [address]  [phone]                                 Hospital Course:        74 year old female with PMH sig for dementia, hypertension, history of SVT scented with falls.  She lives with her  and they both have Alzheimer's.  Apparently family watches them through a ring camera and they saw her falling.  Was brought to the ER and it seems like she was covered in stool and was very disheveled.  She was not alert and was only responding to painful stimuli.  She was found  to be hypothermic and bradycardic.  Pressure was 95.8, heart rate was in the 40s.  With leukocytosis and dirty urinalysis.  X-ray and CT of the brain unremarkable.  Patient on ceftriaxone and admitted for further evaluation and treatment.     Acute E.coli UTI  - cont ceftriaxone - although resistant to CTX, clinically she improved. Will dc with 3 more days of bactrim   - gentle fluids, can dc  - await cultures - Ecoli, R to amp, cefazol, ctx      Metabolic Toxic encephalopathy leading to falls/weakness - improving   - from above, monitor  - CTOH reviewed     Essential HTN  - metoprolol     Hx SVT  - flecainide      Dementia  - donepezil, memantine       Consults: IP CONSULT TO SOCIAL WORK    Operative Procedures:        Patient instructions:      I as the attending physician reconciled the current and discharge medications on day of discharge.     Current Discharge Medication List        START taking these medications    Details   sulfamethoxazole-trimethoprim -160 MG Oral Tab per tablet Take 1 tablet by mouth 2 (two) times daily for 3 days.           CONTINUE these medications which have CHANGED    Details   donepezil 10 MG Oral Tab Take 1 tablet (10 mg total) by mouth nightly.           CONTINUE these medications which have NOT CHANGED    Details   metoprolol succinate 12.5 mg Oral Tab Take 1 Partial Tablet (12.5 mg total) by mouth Daily Beta Blocker.      memantine 10 MG Oral Tab Take 1 tablet (10 mg total) by mouth 2 (two) times daily.      flecainide 100 MG Oral Tab Take 1 tablet (100 mg total) by mouth 2 (two) times daily.      hydrALAZINE 25 MG Oral Tab Take 1 tablet (25 mg total) by mouth 2 (two) times daily as needed (BP greater than 160/90).      FOLIC ACID OR Take by mouth As Directed.      vitamin B-12 50 MCG Oral Tab Take by mouth As Directed.             Activity: activity as tolerated  Diet: regular diet  Wound Care: as directed  Code Status: No Order      Discharge Exam:     General: no acute  distress, alert and oriented x 2, pleasant confusion  Heart: RRR  Lungs: clear bilaterally, no active wheezing  Abdomen: nontender, nondistended, intact BS  Extremities: no pedal edema   Neuro: CN inact, no focal deficits      Total time coordinating care for discharge: Greater than 30 minutes    Javed Parker MD  [facility] Services[facility]        Electronically signed by Fernanda Parker MD on 11/8/2024  2:12 PM         REVIEWER COMMENTS

## (undated) DEVICE — 1200CC GUARDIAN II: Brand: GUARDIAN

## (undated) DEVICE — 10FT COMBINED O2 DELIVERY/CO2 MONITORING. FILTER WITH MICROSTREAM TYPE LUER: Brand: DUAL ADULT NASAL CANNULA

## (undated) DEVICE — GIJAW SINGLE-USE BIOPSY FORCEPS WITH NEEDLE: Brand: GIJAW

## (undated) DEVICE — STERIS KITS

## (undated) DEVICE — BITEBLOCK ENDOSCP 60FR MAXI STRP

## (undated) DEVICE — KIT VLV 5 PC AIR H2O SUCT BX ENDOGATOR CONN

## (undated) DEVICE — 3M™ RED DOT™ MONITORING ELECTRODE WITH FOAM TAPE AND STICKY GEL, 50/BAG, 20/CASE, 72/PLT 2570: Brand: RED DOT™

## (undated) NOTE — ED AVS SNAPSHOT
Heri Haddad   MRN: [id number]    Department:  [facility] Emergency Department   Date of Visit:  10/30/2017           Disclosure     Insurance plans vary and the physician(s) referred by the ER may not be covered by your plan.  Please contact y If you have been prescribed any medication(s), please fill your prescription right away and begin taking the medication(s) as directed    If the emergency physician has read X-rays, these will be re-interpreted by a radiologist.  If there is a significant